# Patient Record
Sex: FEMALE | Race: OTHER | Employment: FULL TIME | URBAN - METROPOLITAN AREA
[De-identification: names, ages, dates, MRNs, and addresses within clinical notes are randomized per-mention and may not be internally consistent; named-entity substitution may affect disease eponyms.]

---

## 2019-02-20 ENCOUNTER — OFFICE VISIT (OUTPATIENT)
Dept: FAMILY MEDICINE CLINIC | Facility: CLINIC | Age: 34
End: 2019-02-20

## 2019-02-20 VITALS
SYSTOLIC BLOOD PRESSURE: 110 MMHG | DIASTOLIC BLOOD PRESSURE: 80 MMHG | OXYGEN SATURATION: 98 % | WEIGHT: 131 LBS | RESPIRATION RATE: 13 BRPM | HEART RATE: 80 BPM | HEIGHT: 65 IN | BODY MASS INDEX: 21.83 KG/M2

## 2019-02-20 DIAGNOSIS — Z30.013 ENCOUNTER FOR INITIAL PRESCRIPTION OF INJECTABLE CONTRACEPTIVE: ICD-10-CM

## 2019-02-20 DIAGNOSIS — N92.1 METRORRHAGIA: ICD-10-CM

## 2019-02-20 DIAGNOSIS — N93.9 ABNORMAL UTERINE BLEEDING (AUB): Primary | ICD-10-CM

## 2019-02-20 LAB
CONTROL LINE PRESENT WITH A CLEAR BACKGROUND (YES/NO): YES YES/NO
DEPRECATED RDW RBC AUTO: 42.2 FL (ref 35.1–46.3)
ERYTHROCYTE [DISTWIDTH] IN BLOOD BY AUTOMATED COUNT: 12.1 % (ref 11–15)
HCT VFR BLD AUTO: 44.3 % (ref 35–48)
HGB BLD-MCNC: 14.7 G/DL (ref 12–16)
MCH RBC QN AUTO: 31.1 PG (ref 26–34)
MCHC RBC AUTO-ENTMCNC: 33.2 G/DL (ref 31–37)
MCV RBC AUTO: 93.7 FL (ref 80–100)
PLATELET # BLD AUTO: 358 10(3)UL (ref 150–450)
PREGNANCY TEST, URINE: NEGATIVE
RBC # BLD AUTO: 4.73 X10(6)UL (ref 3.8–5.3)
TSI SER-ACNC: 1.75 MIU/ML (ref 0.36–3.74)
WBC # BLD AUTO: 9.2 X10(3) UL (ref 4–11)

## 2019-02-20 PROCEDURE — 84443 ASSAY THYROID STIM HORMONE: CPT | Performed by: FAMILY MEDICINE

## 2019-02-20 PROCEDURE — 99203 OFFICE O/P NEW LOW 30 MIN: CPT | Performed by: FAMILY MEDICINE

## 2019-02-20 PROCEDURE — 36415 COLL VENOUS BLD VENIPUNCTURE: CPT | Performed by: FAMILY MEDICINE

## 2019-02-20 PROCEDURE — 85027 COMPLETE CBC AUTOMATED: CPT | Performed by: FAMILY MEDICINE

## 2019-02-20 PROCEDURE — 96372 THER/PROPH/DIAG INJ SC/IM: CPT | Performed by: FAMILY MEDICINE

## 2019-02-20 PROCEDURE — 81025 URINE PREGNANCY TEST: CPT | Performed by: FAMILY MEDICINE

## 2019-02-20 RX ORDER — MEDROXYPROGESTERONE ACETATE 150 MG/ML
150 INJECTION, SUSPENSION INTRAMUSCULAR ONCE
Status: DISCONTINUED | OUTPATIENT
Start: 2019-02-20 | End: 2019-02-20

## 2019-02-20 RX ORDER — MULTIVIT-MIN/IRON FUM/FOLIC AC 7.5 MG-4
1 TABLET ORAL DAILY
COMMUNITY

## 2019-02-20 RX ORDER — MEDROXYPROGESTERONE ACETATE 150 MG/ML
150 INJECTION, SUSPENSION INTRAMUSCULAR ONCE
Status: COMPLETED | OUTPATIENT
Start: 2019-02-20 | End: 2019-02-20

## 2019-02-20 RX ADMIN — MEDROXYPROGESTERONE ACETATE 150 MG: 150 INJECTION, SUSPENSION INTRAMUSCULAR at 17:23:00

## 2019-02-20 NOTE — PROGRESS NOTES
HPI:   Patient presents with:  Menstrual Problem: c/c irregular peridos x1 yr  Other: never had pap smear      Citlali Cunningham is a 26year old female presenting for:    -Having menstrual cycles Q2wks for past 1yr  -Tried OCPs in past which helped as wel OF SYSTEMS:   Review of Systems   Constitutional: Negative for chills, fatigue and fever. Respiratory: Negative for cough and shortness of breath. Cardiovascular: Negative for chest pain, palpitations and leg swelling.    Gastrointestinal: Negative for x1wk. Pt aware that max use is 3yrs. -Encouraged protection against STD    Follow-up in 3mo for physical, pap and bloodwork  Patient indicates understanding of the above recommendations and agrees to the above plan. Reasurrance and education provided.  Al

## 2019-02-20 NOTE — PROGRESS NOTES
CBC and TSH labs drawn per Dr Satnam Newberry orders, Pt tolerated lab draw well    DEPO-PROVERA CONTRACEPTIVE INJECTION 1ml administered to RD IM  Pt aware she needs to return between MAY 8-MAY 22 for next dose

## 2019-02-25 PROBLEM — N92.1 METRORRHAGIA: Status: ACTIVE | Noted: 2019-02-25

## 2019-02-27 ENCOUNTER — TELEPHONE (OUTPATIENT)
Dept: FAMILY MEDICINE CLINIC | Facility: CLINIC | Age: 34
End: 2019-02-27

## 2019-03-01 ENCOUNTER — HOSPITAL ENCOUNTER (OUTPATIENT)
Dept: ULTRASOUND IMAGING | Facility: HOSPITAL | Age: 34
Discharge: HOME | End: 2019-03-01
Attending: FAMILY MEDICINE
Payer: COMMERCIAL

## 2019-03-01 DIAGNOSIS — N92.1 METRORRHAGIA: ICD-10-CM

## 2019-03-01 PROCEDURE — 76830 TRANSVAGINAL US NON-OB: CPT | Performed by: FAMILY MEDICINE

## 2019-03-01 PROCEDURE — 76856 US EXAM PELVIC COMPLETE: CPT | Performed by: FAMILY MEDICINE

## 2019-03-05 ENCOUNTER — TELEPHONE (OUTPATIENT)
Dept: FAMILY MEDICINE CLINIC | Facility: CLINIC | Age: 34
End: 2019-03-05

## 2019-03-05 NOTE — TELEPHONE ENCOUNTER
Pt called back  verified, results below given  Per patient she would like to know what she can take for bone pain and fatigue since labs are normal, per Dr Merrilee Osgood multivitamin and 800-1000 units of Vitamin D with calcium, pt informed

## 2019-03-05 NOTE — TELEPHONE ENCOUNTER
----- Message from Anderson Ortega MD sent at 3/4/2019 10:30 AM CST -----  Please let her know that her U/S is normal. She has small simple ovarian cysts that are benign and usually go away on their own.

## 2019-04-01 ENCOUNTER — OFFICE VISIT (OUTPATIENT)
Dept: FAMILY MEDICINE CLINIC | Facility: CLINIC | Age: 34
End: 2019-04-01

## 2019-04-01 VITALS
SYSTOLIC BLOOD PRESSURE: 110 MMHG | BODY MASS INDEX: 21.49 KG/M2 | OXYGEN SATURATION: 99 % | RESPIRATION RATE: 12 BRPM | HEIGHT: 65 IN | TEMPERATURE: 99 F | DIASTOLIC BLOOD PRESSURE: 70 MMHG | HEART RATE: 76 BPM | WEIGHT: 129 LBS

## 2019-04-01 DIAGNOSIS — R53.82 CHRONIC FATIGUE: ICD-10-CM

## 2019-04-01 DIAGNOSIS — K29.00 ACUTE GASTRITIS WITHOUT HEMORRHAGE, UNSPECIFIED GASTRITIS TYPE: Primary | ICD-10-CM

## 2019-04-01 DIAGNOSIS — M25.50 POLYARTHRALGIA: ICD-10-CM

## 2019-04-01 PROCEDURE — 99214 OFFICE O/P EST MOD 30 MIN: CPT | Performed by: FAMILY MEDICINE

## 2019-04-01 PROCEDURE — 86038 ANTINUCLEAR ANTIBODIES: CPT | Performed by: FAMILY MEDICINE

## 2019-04-01 PROCEDURE — 85652 RBC SED RATE AUTOMATED: CPT | Performed by: FAMILY MEDICINE

## 2019-04-01 PROCEDURE — 86431 RHEUMATOID FACTOR QUANT: CPT | Performed by: FAMILY MEDICINE

## 2019-04-01 PROCEDURE — 82306 VITAMIN D 25 HYDROXY: CPT | Performed by: FAMILY MEDICINE

## 2019-04-01 PROCEDURE — 36415 COLL VENOUS BLD VENIPUNCTURE: CPT | Performed by: FAMILY MEDICINE

## 2019-04-01 PROCEDURE — 80053 COMPREHEN METABOLIC PANEL: CPT | Performed by: FAMILY MEDICINE

## 2019-04-01 PROCEDURE — 86140 C-REACTIVE PROTEIN: CPT | Performed by: FAMILY MEDICINE

## 2019-04-01 PROCEDURE — 86200 CCP ANTIBODY: CPT | Performed by: FAMILY MEDICINE

## 2019-04-01 RX ORDER — OMEGA-3-ACID ETHYL ESTERS 1 G/1
1 CAPSULE, LIQUID FILLED ORAL DAILY
COMMUNITY

## 2019-04-01 RX ORDER — LEVOCETIRIZINE DIHYDROCHLORIDE 5 MG/1
5 TABLET, FILM COATED ORAL EVERY EVENING
Qty: 30 TABLET | Refills: 2 | Status: SHIPPED | OUTPATIENT
Start: 2019-04-01 | End: 2020-06-18

## 2019-04-01 RX ORDER — FAMOTIDINE 40 MG/1
40 TABLET, FILM COATED ORAL DAILY
Qty: 30 TABLET | Refills: 2 | Status: SHIPPED | OUTPATIENT
Start: 2019-04-01 | End: 2019-08-14

## 2019-04-01 NOTE — PROGRESS NOTES
VITAMIN D, DALTON,CMP,C-REACTIVE,SED RATE,CYCLIN,and RA factor labs drawn per Dr Natalie Nava orders, Pt tolerated lab draw well

## 2019-04-01 NOTE — PROGRESS NOTES
HPI:   Patient presents with:  Cough: c/c cough, vomiting saliva x2 months      Darien Marcial is a 26year old female presenting for: For past 3mo having nausea, post-prandial cough, acid reflux.  Sometimes vomits  No abdominal pain, fever, heartbu Results   Component Value Date/Time    GLU 93 04/01/2019 04:45 PM     04/01/2019 04:45 PM    K 3.6 04/01/2019 04:45 PM     (H) 04/01/2019 04:45 PM    CO2 23.0 04/01/2019 04:45 PM    CREATSERUM 0.54 (L) 04/01/2019 04:45 PM    CA 8.7 04/01/2019 0 PHYSICAL EXAM:   /70   Pulse 76   Temp 99 °F (37.2 °C)   Resp 12   Ht 65\"   Wt 129 lb   SpO2 99%   BMI 21.47 kg/m²  Estimated body mass index is 21.47 kg/m² as calculated from the following:    Height as of this encounter: 65\".     Weight as of th PANEL (14); Future  - DALTON,DIRECT,REFLEX TITER + SPECIFIC ANTIBODIES; Future  -  3.  Chronic fatigue  -Supportive care discussed including proper sleep, hydration, healthy eating, exercise, stress-relieve  -Bloodwork obtained (previous TSH and CBC wnl, will

## 2019-04-03 PROBLEM — R53.82 CHRONIC FATIGUE: Status: ACTIVE | Noted: 2019-04-03

## 2019-04-03 PROBLEM — M25.50 POLYARTHRALGIA: Status: ACTIVE | Noted: 2019-04-03

## 2019-04-03 PROBLEM — K29.00 ACUTE GASTRITIS WITHOUT HEMORRHAGE: Status: ACTIVE | Noted: 2019-04-03

## 2019-04-17 ENCOUNTER — OFFICE VISIT (OUTPATIENT)
Dept: FAMILY MEDICINE CLINIC | Facility: CLINIC | Age: 34
End: 2019-04-17

## 2019-04-17 ENCOUNTER — TELEPHONE (OUTPATIENT)
Dept: FAMILY MEDICINE CLINIC | Facility: CLINIC | Age: 34
End: 2019-04-17

## 2019-04-17 VITALS
RESPIRATION RATE: 12 BRPM | WEIGHT: 131 LBS | SYSTOLIC BLOOD PRESSURE: 110 MMHG | HEART RATE: 94 BPM | DIASTOLIC BLOOD PRESSURE: 70 MMHG | HEIGHT: 65 IN | OXYGEN SATURATION: 99 % | BODY MASS INDEX: 21.83 KG/M2

## 2019-04-17 DIAGNOSIS — M20.22 HALLUX RIGIDUS OF LEFT FOOT: ICD-10-CM

## 2019-04-17 DIAGNOSIS — L60.0 INGROWN NAIL: Primary | ICD-10-CM

## 2019-04-17 DIAGNOSIS — K29.00 ACUTE GASTRITIS WITHOUT HEMORRHAGE, UNSPECIFIED GASTRITIS TYPE: ICD-10-CM

## 2019-04-17 DIAGNOSIS — H52.209 ASTIGMATISM, UNSPECIFIED LATERALITY, UNSPECIFIED TYPE: ICD-10-CM

## 2019-04-17 PROCEDURE — 99214 OFFICE O/P EST MOD 30 MIN: CPT | Performed by: FAMILY MEDICINE

## 2019-04-17 RX ORDER — CHLORHEXIDINE GLUCONATE 2% 2 G/100ML
SOLUTION TOPICAL
Qty: 60 ML | Refills: 0 | Status: SHIPPED | OUTPATIENT
Start: 2019-04-17 | End: 2019-06-14

## 2019-04-17 NOTE — PATIENT INSTRUCTIONS
Apply topical antibiotic after each warm soak. Warm soaks (of water mixed with small amount of chlorhexidine antiseptic solution) should last 10 to 15 minutes.

## 2019-04-17 NOTE — PROGRESS NOTES
HPI:   Patient presents with:  Ingrown Toenail: left foot ingrown toenail x2 weeks      Dyane Peabody is a 26year old female presenting for:    Left foot  -has ingrown toenail recurrently that is becoming painful.  No surrounding infection  -has both  04/01/2019 04:45 PM    K 3.6 04/01/2019 04:45 PM     (H) 04/01/2019 04:45 PM    CO2 23.0 04/01/2019 04:45 PM    CREATSERUM 0.54 (L) 04/01/2019 04:45 PM    CA 8.7 04/01/2019 04:45 PM    ALB 3.7 04/01/2019 04:45 PM    TP 7.4 04/01/2019 04:45 PM Neurological: Negative for headaches.           PHYSICAL EXAM:   /70   Pulse 94   Resp 12   Ht 65\"   Wt 131 lb   SpO2 99%   BMI 21.80 kg/m²  Estimated body mass index is 21.8 kg/m² as calculated from the following:    Height as of this encounter: 65\ Requested Prescriptions     Signed Prescriptions Disp Refills   • Chlorhexidine Gluconate 2 % External Solution 60 mL 0     Sig: Apply small amount to warm water for antiseptic soaks for the toenails.        No orders of the defined types were placed in [facility]

## 2019-04-23 NOTE — TELEPHONE ENCOUNTER
Good afternoon,     Please add supporting clinicals for pt to go OON. P will not process this ref w/o this info.      Thank you,   Desi

## 2019-04-25 NOTE — TELEPHONE ENCOUNTER
Patient informed that [facility] is out of network; she needs to utilize [employer]. Will contact Dr. Lina Ferris for an appointment.

## 2019-05-08 ENCOUNTER — OFFICE VISIT (OUTPATIENT)
Dept: PODIATRY CLINIC | Facility: CLINIC | Age: 34
End: 2019-05-08

## 2019-05-08 DIAGNOSIS — L60.0 INGROWN TOENAIL OF LEFT FOOT: Primary | ICD-10-CM

## 2019-05-08 PROCEDURE — 99243 OFF/OP CNSLTJ NEW/EST LOW 30: CPT | Performed by: PODIATRIST

## 2019-05-08 PROCEDURE — 99212 OFFICE O/P EST SF 10 MIN: CPT | Performed by: PODIATRIST

## 2019-05-08 NOTE — PROGRESS NOTES
HPI:    Patient ID: Slick Puga is a 37year old female. This pleasant 68-year-old female presents as a new patient to me on consult from [address]. She is accompanied today by her  and 1 of my staff assisted in translation from [place] local anesthesia at her convenience. Second concern is an area of keratosis over the proximal interphalangeal joint of the fifth right toe. She trims it periodically and has only temporary relief.   The fifth toe has a contracted position and there is a d

## 2019-05-15 ENCOUNTER — OFFICE VISIT (OUTPATIENT)
Dept: PODIATRY CLINIC | Facility: CLINIC | Age: 34
End: 2019-05-15

## 2019-05-15 ENCOUNTER — OFFICE VISIT (OUTPATIENT)
Dept: FAMILY MEDICINE CLINIC | Facility: CLINIC | Age: 34
End: 2019-05-15

## 2019-05-15 VITALS
HEART RATE: 71 BPM | BODY MASS INDEX: 21.66 KG/M2 | SYSTOLIC BLOOD PRESSURE: 120 MMHG | DIASTOLIC BLOOD PRESSURE: 80 MMHG | OXYGEN SATURATION: 98 % | WEIGHT: 130 LBS | HEIGHT: 65 IN

## 2019-05-15 VITALS — HEART RATE: 71 BPM | DIASTOLIC BLOOD PRESSURE: 69 MMHG | SYSTOLIC BLOOD PRESSURE: 107 MMHG

## 2019-05-15 DIAGNOSIS — Z30.42 ENCOUNTER FOR SURVEILLANCE OF INJECTABLE CONTRACEPTIVE: ICD-10-CM

## 2019-05-15 DIAGNOSIS — Z12.4 PAP SMEAR FOR CERVICAL CANCER SCREENING: ICD-10-CM

## 2019-05-15 DIAGNOSIS — L60.0 INGROWN TOENAIL OF LEFT FOOT: Primary | ICD-10-CM

## 2019-05-15 DIAGNOSIS — G89.29 CHRONIC RIGHT HIP PAIN: ICD-10-CM

## 2019-05-15 DIAGNOSIS — M25.561 CHRONIC PAIN OF BOTH KNEES: ICD-10-CM

## 2019-05-15 DIAGNOSIS — M25.551 CHRONIC RIGHT HIP PAIN: ICD-10-CM

## 2019-05-15 DIAGNOSIS — F42.9 OBSESSIVE-COMPULSIVE DISORDER, UNSPECIFIED TYPE: ICD-10-CM

## 2019-05-15 DIAGNOSIS — M25.562 CHRONIC PAIN OF BOTH KNEES: ICD-10-CM

## 2019-05-15 DIAGNOSIS — G89.29 CHRONIC PAIN OF BOTH KNEES: ICD-10-CM

## 2019-05-15 DIAGNOSIS — Z00.00 WELLNESS EXAMINATION: Primary | ICD-10-CM

## 2019-05-15 PROCEDURE — 81025 URINE PREGNANCY TEST: CPT | Performed by: FAMILY MEDICINE

## 2019-05-15 PROCEDURE — 11750 EXCISION NAIL&NAIL MATRIX: CPT | Performed by: PODIATRIST

## 2019-05-15 PROCEDURE — 99213 OFFICE O/P EST LOW 20 MIN: CPT | Performed by: FAMILY MEDICINE

## 2019-05-15 PROCEDURE — 96372 THER/PROPH/DIAG INJ SC/IM: CPT | Performed by: FAMILY MEDICINE

## 2019-05-15 PROCEDURE — 87625 HPV TYPES 16 & 18 ONLY: CPT | Performed by: FAMILY MEDICINE

## 2019-05-15 PROCEDURE — 88175 CYTOPATH C/V AUTO FLUID REDO: CPT | Performed by: FAMILY MEDICINE

## 2019-05-15 PROCEDURE — 87624 HPV HI-RISK TYP POOLED RSLT: CPT | Performed by: FAMILY MEDICINE

## 2019-05-15 PROCEDURE — 99395 PREV VISIT EST AGE 18-39: CPT | Performed by: FAMILY MEDICINE

## 2019-05-15 RX ORDER — FLUOXETINE 20 MG/1
20 TABLET, FILM COATED ORAL DAILY
Qty: 90 TABLET | Refills: 0 | Status: SHIPPED | OUTPATIENT
Start: 2019-05-15 | End: 2019-07-17 | Stop reason: DRUGHIGH

## 2019-05-15 RX ORDER — MEDROXYPROGESTERONE ACETATE 150 MG/ML
150 INJECTION, SUSPENSION INTRAMUSCULAR ONCE
Status: DISCONTINUED | OUTPATIENT
Start: 2019-05-15 | End: 2019-05-15

## 2019-05-15 RX ORDER — CYCLOBENZAPRINE HCL 5 MG
5 TABLET ORAL NIGHTLY
Qty: 14 TABLET | Refills: 0 | Status: SHIPPED | OUTPATIENT
Start: 2019-05-15 | End: 2019-05-29

## 2019-05-15 RX ORDER — METHYLPREDNISOLONE 4 MG/1
TABLET ORAL
Qty: 1 KIT | Refills: 0 | Status: SHIPPED | OUTPATIENT
Start: 2019-05-15 | End: 2019-06-14

## 2019-05-15 RX ORDER — MEDROXYPROGESTERONE ACETATE 150 MG/ML
150 INJECTION, SUSPENSION INTRAMUSCULAR ONCE
Status: COMPLETED | OUTPATIENT
Start: 2019-05-15 | End: 2019-05-15

## 2019-05-15 RX ADMIN — MEDROXYPROGESTERONE ACETATE 150 MG: 150 INJECTION, SUSPENSION INTRAMUSCULAR at 19:27:00

## 2019-05-15 NOTE — PROGRESS NOTES
HPI:    Patient ID: Serina Green is a 37year old female. This 57-year-old female presents for correction of ingrown toenail of the left hallux. After review of the procedure patient signed a written consent.       ROS:     I did review medical statu

## 2019-05-15 NOTE — PROGRESS NOTES
CC: Annual Physical Exam    HPI:   Jigar Alston is a 37year old female who presents for a complete physical exam.    HCM  -Diet:  Well-balanced.   -Exercise irregularly  -Mental Health: denies any depression or anxiety sx  -Skin care:  no concerning antiseptic soaks for the toenails. Disp: 60 mL Rfl: 0   Omega-3-acid Ethyl Esters 1 g Oral Cap Take 1 g by mouth daily. Disp:  Rfl:    famotidine 40 MG Oral Tab Take 1 tablet (40 mg total) by mouth daily.  For gastritis Disp: 30 tablet Rfl: 2   Levocetirizi is 21.63 kg/m² as calculated from the following:    Height as of this encounter: 65\". Weight as of this encounter: 130 lb. Vital signs reviewed. Appears stated age, well groomed.   Physical Exam:  GEN:  Patient is alert, awake and oriented, well develo disorder, unspecified type    Depression/Anxiety  -trial of fluoxetine. Discussed side effects, delayed effectiveness, increased suicidality  - referral declined  -encouraged stress-relieving techniques    5. Chronic right hip pain  6.  Chronic pain of mary

## 2019-05-16 NOTE — PROGRESS NOTES
Depo-Provera 1ml  LOT [id number]  EXP 04/30/2021  NDC: 3639-2668-77    Depo 1ml administered to RD IM ,pregnancy test negative  Pt should return between [address] [place] [address] 31- AUGUST 14 for next dose

## 2019-05-21 ENCOUNTER — OFFICE VISIT (OUTPATIENT)
Dept: PODIATRY CLINIC | Facility: CLINIC | Age: 34
End: 2019-05-21

## 2019-05-21 DIAGNOSIS — L60.0 INGROWN TOENAIL OF LEFT FOOT: Primary | ICD-10-CM

## 2019-05-21 PROCEDURE — 99212 OFFICE O/P EST SF 10 MIN: CPT | Performed by: PODIATRIST

## 2019-05-21 PROCEDURE — 99024 POSTOP FOLLOW-UP VISIT: CPT | Performed by: PODIATRIST

## 2019-05-21 NOTE — PROGRESS NOTES
HPI:    Patient ID: Laila Barry is a 37year old female. This 75-year-old female presents 1 week post ingrown toenail procedure. She has no specific noted complaints or concerns.   The draining noted is consistent with the procedure there is no sign

## 2019-05-24 ENCOUNTER — TELEPHONE (OUTPATIENT)
Dept: FAMILY MEDICINE CLINIC | Facility: CLINIC | Age: 34
End: 2019-05-24

## 2019-05-28 DIAGNOSIS — R87.610 ASCUS WITH POSITIVE HIGH RISK HPV CERVICAL: Primary | ICD-10-CM

## 2019-05-28 DIAGNOSIS — R87.810 ASCUS WITH POSITIVE HIGH RISK HPV CERVICAL: Primary | ICD-10-CM

## 2019-06-12 ENCOUNTER — OFFICE VISIT (OUTPATIENT)
Dept: PODIATRY CLINIC | Facility: CLINIC | Age: 34
End: 2019-06-12

## 2019-06-12 DIAGNOSIS — L60.0 INGROWN TOENAIL OF LEFT FOOT: Primary | ICD-10-CM

## 2019-06-12 PROCEDURE — 99212 OFFICE O/P EST SF 10 MIN: CPT | Performed by: PODIATRIST

## 2019-06-12 NOTE — PROGRESS NOTES
HPI:    Patient ID: Darien Ceja is a 37year old female. 33-year-old female presents 1 month post ingrown toenail procedure of the right great toe. Patient reports no noted concerns.       ROS:              Current Outpatient Medications:  FLUoxetin

## 2019-06-14 ENCOUNTER — TELEPHONE (OUTPATIENT)
Dept: OBGYN CLINIC | Facility: CLINIC | Age: 34
End: 2019-06-14

## 2019-06-14 ENCOUNTER — OFFICE VISIT (OUTPATIENT)
Dept: OBGYN CLINIC | Facility: CLINIC | Age: 34
End: 2019-06-14

## 2019-06-14 VITALS — DIASTOLIC BLOOD PRESSURE: 70 MMHG | SYSTOLIC BLOOD PRESSURE: 112 MMHG | HEART RATE: 89 BPM

## 2019-06-14 DIAGNOSIS — R87.610 ASCUS WITH POSITIVE HIGH RISK HPV CERVICAL: Primary | ICD-10-CM

## 2019-06-14 DIAGNOSIS — R87.810 ASCUS WITH POSITIVE HIGH RISK HPV CERVICAL: Primary | ICD-10-CM

## 2019-06-14 DIAGNOSIS — F41.8 ANXIETY ABOUT HEALTH: ICD-10-CM

## 2019-06-14 PROCEDURE — 99244 OFF/OP CNSLTJ NEW/EST MOD 40: CPT | Performed by: OBSTETRICS & GYNECOLOGY

## 2019-06-14 NOTE — TELEPHONE ENCOUNTER
Pt was seen today  Was told to get gardasil vacc, ck'g  with ins for coverage    Should she have vacc before colpo (7-12-19) or can be given at same date of colpo    Please verify

## 2019-06-14 NOTE — PROGRESS NOTES
Rizwana Gu is a 37year old female  No LMP recorded. Patient has had an injection.  Patient presents with:  Consult: abnormal pap per Dr. Monty Payne -- new pt. -- first pap ever; 3 partners whole life -- started at 27 y.o.; never had ga current or ex partner: Not on file        Emotionally abused: Not on file        Physically abused: Not on file        Forced sexual activity: Not on file    Other Topics      Concerns:        Caffeine Concern: Not Asked        Exercise: Not Asked        S , spoke to pt re: HPV pathophys & ways to try to suppress. Consider Gardasil 9. Need for colpo reviewed w/ pt -- overall plan of care based on result -- if neg or LINDY 1, cotest in one year. If LINDY 2 or higher, LEEP & then cotest in one year.  All

## 2019-06-14 NOTE — TELEPHONE ENCOUNTER
Pt was seen today and notes are not completed yet. Okay for pt to have 1st Gardasil on same day as Colpo?

## 2019-06-14 NOTE — TELEPHONE ENCOUNTER
Notified pt, per [facility] she can get Gardasil inj when she comes in for Colpo. Advised pt to check with her insurance for coverage. Pt agrees. Routed to referral pool in case pt needs referral for Gardasil injection or Colpo.

## 2019-06-17 NOTE — TELEPHONE ENCOUNTER
Ariana Haque,  #[id number] PT NOTIFIED THAT WE ARE FAIRLY CERTAIN HPV SHOTS ARE COVERED THROUGH OUR OFFICE BUT A REFERRAL WAS PLACED. GAVE HER THE PHONE NUMBER FOR MANAGED CARE TO CHECK THE STATUS IN ABOUT 1 WEEK.   ALSO ADVISED THAT ABDI

## 2019-06-17 NOTE — TELEPHONE ENCOUNTER
Kyrgyz speaker- stated when she called her insurance, they told her the gardisil shot will be covered if her primary dr administers.  She wants to verify with RN if [facility] administers, does she need a referral from Dr. Libia Bartholomew, or does she need to have it do

## 2019-07-09 ENCOUNTER — TELEPHONE (OUTPATIENT)
Dept: OBGYN CLINIC | Facility: CLINIC | Age: 34
End: 2019-07-09

## 2019-07-09 NOTE — TELEPHONE ENCOUNTER
See other 6/14/19 comm. In pts referral tab in chart, its states that gardisil referral was authorized. Pt informed and verbalized understanding.

## 2019-07-12 ENCOUNTER — OFFICE VISIT (OUTPATIENT)
Dept: OBGYN CLINIC | Facility: CLINIC | Age: 34
End: 2019-07-12

## 2019-07-12 VITALS — DIASTOLIC BLOOD PRESSURE: 68 MMHG | SYSTOLIC BLOOD PRESSURE: 111 MMHG | HEART RATE: 73 BPM

## 2019-07-12 DIAGNOSIS — R87.610 PAPANICOLAOU SMEAR OF CERVIX WITH ATYPICAL SQUAMOUS CELLS OF UNDETERMINED SIGNIFICANCE (ASC-US): ICD-10-CM

## 2019-07-12 DIAGNOSIS — R87.810 CERVICAL HIGH RISK HUMAN PAPILLOMAVIRUS (HPV) DNA TEST POSITIVE: ICD-10-CM

## 2019-07-12 PROCEDURE — 90651 9VHPV VACCINE 2/3 DOSE IM: CPT | Performed by: OBSTETRICS & GYNECOLOGY

## 2019-07-12 PROCEDURE — 57454 BX/CURETT OF CERVIX W/SCOPE: CPT | Performed by: OBSTETRICS & GYNECOLOGY

## 2019-07-12 PROCEDURE — 90471 IMMUNIZATION ADMIN: CPT | Performed by: OBSTETRICS & GYNECOLOGY

## 2019-07-12 NOTE — PROCEDURES
Colpo w/Cx Biopsy and ECC      Birth control method(s) used: depoprovera    Consent signed. Procedure discussed with patient in detail including indication, risk, benefits, alternatives and complications.     Indication: ASCUS (+) HPV    Procedure:  Unde

## 2019-07-12 NOTE — PROGRESS NOTES
Pt was in office for colpo, pt was given 1st dose of Gardasil. Injection was given on the RT arm. Pt monitored for 15 minutes.  Pt did state that she does not do well with injections, pt did start to feel a little lightheaded and asked for an alcohol wipe t

## 2019-07-17 NOTE — PROGRESS NOTES
My Chart message sent to patient re: colpo LINDY 1. Please follow -up & make sure pt saw their letter within next week. Enter patient info into Pap log.

## 2019-07-17 NOTE — PROGRESS NOTES
HPI:   Patient presents with:  Medication Follow-Up      Myah Hernandez is a 37year old female presenting for:  OCD  -about 50% improved  -feels better    Stool findings  -on a couple occasions has found \"white dots on my stool\".   -no foreing trave formalin-filled jar labeled \"Talia, cervical biopsy. \" The specimen consists of one piece of pink to white-tan possible cervical tissue measuring 0.2 cm in greatest dimension. The specimen is submitted in total in cassette A.      Specimen B is received Diagnosis Date   • Anxiety    • Thyroid disease     resolved per pt         No past surgical history on file.   No Known Allergies   Social History:  Social History    Tobacco Use      Smoking status: Never Smoker      Smokeless tobacco: Never Used    Alc Abdominal: Soft. Bowel sounds are normal. She exhibits no distension. There is no tenderness. Musculoskeletal: She exhibits no edema. Neurological: No cranial nerve deficit. Psychiatric: She has a normal mood and affect.  Her behavior is normal.   V

## 2019-07-18 ENCOUNTER — TELEPHONE (OUTPATIENT)
Dept: OBGYN CLINIC | Facility: CLINIC | Age: 34
End: 2019-07-18

## 2019-07-18 NOTE — TELEPHONE ENCOUNTER
----- Message from Shravan Barney MD sent at 7/17/2019  6:42 PM CDT -----  My Chart message sent to patient re: colpo LINDY 1. Please follow -up & make sure pt saw their letter within next week. Enter patient info into Pap log.

## 2019-07-18 NOTE — TELEPHONE ENCOUNTER
Pt given results vis [facility], [address] #[id number]. Appt made for cotest on 7/20/20. Pt is wondering if she needs to refrain from intercourse until she is done with all 3 gardasil injections.   Pt states it might be with the same partner, or with a differe

## 2019-07-24 ENCOUNTER — APPOINTMENT (OUTPATIENT)
Dept: LAB | Facility: HOSPITAL | Age: 34
End: 2019-07-24
Attending: FAMILY MEDICINE
Payer: COMMERCIAL

## 2019-07-24 DIAGNOSIS — R19.5 ABNORMAL FINDINGS IN STOOL: ICD-10-CM

## 2019-07-24 LAB
ADENOVIRUS F 40/41 PCR: NEGATIVE
ASTROVIRUS PCR: NEGATIVE
C CAYETANENSIS DNA SPEC QL NAA+PROBE: NEGATIVE
C. DIFFICILE TOXIN A/B PCR: NEGATIVE
CAMPY SP DNA.DIARRHEA STL QL NAA+PROBE: NEGATIVE
CRYPTOSP DNA SPEC QL NAA+PROBE: NEGATIVE
EAEC PAA PLAS AGGR+AATA ST NAA+NON-PRB: NEGATIVE
EC STX1+STX2 + H7 FLIC SPEC NAA+PROBE: NEGATIVE
ENTAMOEBA HISTOLYTICA PCR: NEGATIVE
EPEC EAE GENE STL QL NAA+NON-PROBE: POSITIVE
ETEC LTA+ST1A+ST1B TOX ST NAA+NON-PROBE: NEGATIVE
GIARDIA LAMBLIA PCR: NEGATIVE
NOROVIRUS GI/GII PCR: NEGATIVE
P SHIGELLOIDES DNA STL QL NAA+PROBE: NEGATIVE
ROTAVIRUS A PCR: NEGATIVE
SALMONELLA DNA SPEC QL NAA+PROBE: NEGATIVE
SAPOVIRUS PCR: NEGATIVE
SHIGELLA SP+EIEC IPAH ST NAA+NON-PROBE: NEGATIVE
V CHOLERAE DNA SPEC QL NAA+PROBE: NEGATIVE
VIBRIO DNA SPEC NAA+PROBE: NEGATIVE
YERSINIA DNA SPEC NAA+PROBE: NEGATIVE

## 2019-07-24 PROCEDURE — 87507 IADNA-DNA/RNA PROBE TQ 12-25: CPT

## 2019-08-14 ENCOUNTER — TELEPHONE (OUTPATIENT)
Dept: FAMILY MEDICINE CLINIC | Facility: CLINIC | Age: 34
End: 2019-08-14

## 2019-08-14 DIAGNOSIS — K29.00 ACUTE GASTRITIS WITHOUT HEMORRHAGE, UNSPECIFIED GASTRITIS TYPE: ICD-10-CM

## 2019-08-14 RX ORDER — FAMOTIDINE 40 MG/1
40 TABLET, FILM COATED ORAL DAILY
Qty: 30 TABLET | Refills: 2 | Status: SHIPPED | OUTPATIENT
Start: 2019-08-14 | End: 2019-11-20

## 2019-08-14 RX ORDER — CYCLOBENZAPRINE HCL 5 MG
5 TABLET ORAL NIGHTLY
Qty: 14 TABLET | Refills: 0 | Status: SHIPPED | OUTPATIENT
Start: 2019-08-14 | End: 2019-08-28

## 2019-09-11 ENCOUNTER — TELEPHONE (OUTPATIENT)
Dept: OBGYN CLINIC | Facility: CLINIC | Age: 34
End: 2019-09-11

## 2019-09-11 DIAGNOSIS — Z23 NEED FOR HPV VACCINE: Primary | ICD-10-CM

## 2019-09-11 NOTE — TELEPHONE ENCOUNTER
Used . Pt made an appt for 9/21/19 for Gardasil. When appt made, it stated that a referral was needed. Sent to our referral coordinator.

## 2019-09-11 NOTE — TELEPHONE ENCOUNTER
Pt was scheduled for an injection tomorrow and canceled pt unsure the name but wondering if ok to wait jammie reschedule on the 28th.  Please advise Paraguayan speaking

## 2019-09-21 ENCOUNTER — NURSE ONLY (OUTPATIENT)
Dept: OBGYN CLINIC | Facility: CLINIC | Age: 34
End: 2019-09-21

## 2019-09-21 VITALS — DIASTOLIC BLOOD PRESSURE: 72 MMHG | SYSTOLIC BLOOD PRESSURE: 109 MMHG | HEART RATE: 80 BPM

## 2019-09-21 DIAGNOSIS — Z23 NEED FOR HPV VACCINE: Primary | ICD-10-CM

## 2019-09-21 PROCEDURE — 90471 IMMUNIZATION ADMIN: CPT | Performed by: OBSTETRICS & GYNECOLOGY

## 2019-09-21 PROCEDURE — 90651 9VHPV VACCINE 2/3 DOSE IM: CPT | Performed by: OBSTETRICS & GYNECOLOGY

## 2019-09-21 PROCEDURE — 99211 OFF/OP EST MAY X REQ PHY/QHP: CPT | Performed by: OBSTETRICS & GYNECOLOGY

## 2019-09-21 NOTE — PROGRESS NOTES
PT HERE TODAY FOR GARDASIL #2, PT IS WITHOUT ANY COMPLAINT,PT WAS MONITORED FOR 15 MINUTES WITHOUT ANY ADVERSE REACTION. PT WILL RETURN TO THE OFFICE IN 4 MONTHS FOR THE THIRD DOSE.

## 2019-10-16 ENCOUNTER — TELEPHONE (OUTPATIENT)
Dept: FAMILY MEDICINE CLINIC | Facility: CLINIC | Age: 34
End: 2019-10-16

## 2019-10-17 RX ORDER — CYCLOBENZAPRINE HCL 5 MG
5 TABLET ORAL NIGHTLY
Qty: 7 TABLET | Refills: 0 | Status: SHIPPED | OUTPATIENT
Start: 2019-10-17 | End: 2019-11-24

## 2019-10-27 DIAGNOSIS — F42.9 OBSESSIVE-COMPULSIVE DISORDER, UNSPECIFIED TYPE: ICD-10-CM

## 2019-10-30 RX ORDER — FLUOXETINE HYDROCHLORIDE 40 MG/1
CAPSULE ORAL
Qty: 30 CAPSULE | Refills: 0 | Status: SHIPPED | OUTPATIENT
Start: 2019-10-30 | End: 2019-11-20

## 2019-11-20 ENCOUNTER — OFFICE VISIT (OUTPATIENT)
Dept: FAMILY MEDICINE CLINIC | Facility: CLINIC | Age: 34
End: 2019-11-20

## 2019-11-20 VITALS
RESPIRATION RATE: 13 BRPM | OXYGEN SATURATION: 99 % | HEART RATE: 84 BPM | WEIGHT: 126 LBS | HEIGHT: 65 IN | BODY MASS INDEX: 20.99 KG/M2 | DIASTOLIC BLOOD PRESSURE: 74 MMHG | SYSTOLIC BLOOD PRESSURE: 122 MMHG

## 2019-11-20 DIAGNOSIS — K29.00 ACUTE GASTRITIS WITHOUT HEMORRHAGE, UNSPECIFIED GASTRITIS TYPE: Primary | ICD-10-CM

## 2019-11-20 DIAGNOSIS — L81.9 HYPERPIGMENTATION: ICD-10-CM

## 2019-11-20 DIAGNOSIS — F42.9 OBSESSIVE-COMPULSIVE DISORDER, UNSPECIFIED TYPE: ICD-10-CM

## 2019-11-20 PROCEDURE — 99214 OFFICE O/P EST MOD 30 MIN: CPT | Performed by: FAMILY MEDICINE

## 2019-11-20 RX ORDER — FLUOXETINE HYDROCHLORIDE 40 MG/1
80 CAPSULE ORAL DAILY
Qty: 180 CAPSULE | Refills: 0 | Status: SHIPPED | OUTPATIENT
Start: 2019-11-20 | End: 2020-08-10 | Stop reason: ALTCHOICE

## 2019-11-20 RX ORDER — FAMOTIDINE 40 MG/1
40 TABLET, FILM COATED ORAL DAILY PRN
Qty: 30 TABLET | Refills: 2 | Status: SHIPPED | OUTPATIENT
Start: 2019-11-20 | End: 2020-08-10 | Stop reason: ALTCHOICE

## 2019-11-21 NOTE — PROGRESS NOTES
HPI:   Patient presents with:  Medication Follow-Up      Matildamarianna Ordaz is a 37year old female presenting for:    OCD  -improving but still w/ flare-ups  -has been taking 2 tablets at home instead and its healing better.  -feels better     Gastritis 05:12 PM          Medications:  Current Outpatient Medications   Medication Sig Dispense Refill   • famotidine 40 MG Oral Tab Take 1 tablet (40 mg total) by mouth daily as needed.  For gastritis 30 tablet 2   • FLUoxetine HCl 40 MG Oral Cap Take 2 capsules Wt 126 lb (57.2 kg)   SpO2 99%   BMI 20.97 kg/m²  Estimated body mass index is 20.97 kg/m² as calculated from the following:    Height as of this encounter: 65\". Weight as of this encounter: 126 lb (57.2 kg).    Wt Readings from Last 3 Encounters:  11/2 plan.  Reasurrance and education provided. All questions answered. Notified to call with any questions, complications, allergies, or worsening or changing symptoms as well as any side effects or complications from the treatments .   Red flags/ ER precautio

## 2019-11-22 PROBLEM — F42.9 OBSESSIVE-COMPULSIVE DISORDER: Status: ACTIVE | Noted: 2019-11-22

## 2019-11-25 RX ORDER — CYCLOBENZAPRINE HCL 5 MG
TABLET ORAL
Qty: 7 TABLET | Refills: 0 | Status: SHIPPED | OUTPATIENT
Start: 2019-11-25 | End: 2020-01-25

## 2020-01-25 ENCOUNTER — NURSE ONLY (OUTPATIENT)
Dept: OBGYN CLINIC | Facility: CLINIC | Age: 35
End: 2020-01-25

## 2020-01-25 VITALS
WEIGHT: 126 LBS | DIASTOLIC BLOOD PRESSURE: 66 MMHG | BODY MASS INDEX: 21 KG/M2 | SYSTOLIC BLOOD PRESSURE: 113 MMHG | HEART RATE: 88 BPM

## 2020-01-25 DIAGNOSIS — Z23 NEED FOR HPV VACCINATION: Primary | ICD-10-CM

## 2020-01-25 PROCEDURE — 90471 IMMUNIZATION ADMIN: CPT | Performed by: OBSTETRICS & GYNECOLOGY

## 2020-01-25 PROCEDURE — 90651 9VHPV VACCINE 2/3 DOSE IM: CPT | Performed by: OBSTETRICS & GYNECOLOGY

## 2020-01-25 RX ORDER — CYCLOBENZAPRINE HCL 5 MG
TABLET ORAL
Qty: 7 TABLET | Refills: 0 | Status: SHIPPED | OUTPATIENT
Start: 2020-01-25 | End: 2020-02-20 | Stop reason: ALTCHOICE

## 2020-01-25 RX ORDER — CYCLOBENZAPRINE HCL 5 MG
TABLET ORAL
Qty: 7 TABLET | Refills: 0 | OUTPATIENT
Start: 2020-01-25

## 2020-01-25 NOTE — PROGRESS NOTES
Pt. Present today for Gardasil #3. Per [facility]. Administered on Left Deltoid. M.A. waited with pt for 15 min with no adverse reactions. Encouraged to call with any questions or concerns. Pt verbalized understanding.      Gardasil 9  NDC: 6737-0449-13  Single do

## 2020-02-20 ENCOUNTER — OFFICE VISIT (OUTPATIENT)
Dept: FAMILY MEDICINE CLINIC | Facility: CLINIC | Age: 35
End: 2020-02-20

## 2020-02-20 VITALS
BODY MASS INDEX: 20.99 KG/M2 | DIASTOLIC BLOOD PRESSURE: 82 MMHG | WEIGHT: 126 LBS | OXYGEN SATURATION: 99 % | TEMPERATURE: 98 F | SYSTOLIC BLOOD PRESSURE: 120 MMHG | HEIGHT: 65 IN | HEART RATE: 85 BPM

## 2020-02-20 DIAGNOSIS — J02.9 SORE THROAT: ICD-10-CM

## 2020-02-20 DIAGNOSIS — J01.90 ACUTE RHINOSINUSITIS: ICD-10-CM

## 2020-02-20 DIAGNOSIS — J02.0 STREP PHARYNGITIS: Primary | ICD-10-CM

## 2020-02-20 LAB
CONTROL LINE PRESENT WITH A CLEAR BACKGROUND (YES/NO): YES YES/NO
KIT LOT #: ABNORMAL NUMERIC
STREP GRP A CUL-SCR: POSITIVE

## 2020-02-20 PROCEDURE — 87880 STREP A ASSAY W/OPTIC: CPT | Performed by: PHYSICIAN ASSISTANT

## 2020-02-20 PROCEDURE — 99213 OFFICE O/P EST LOW 20 MIN: CPT | Performed by: PHYSICIAN ASSISTANT

## 2020-02-20 RX ORDER — AMOXICILLIN 875 MG/1
875 TABLET, COATED ORAL 2 TIMES DAILY
Qty: 20 TABLET | Refills: 0 | Status: SHIPPED | OUTPATIENT
Start: 2020-02-20 | End: 2020-03-01

## 2020-02-21 NOTE — PATIENT INSTRUCTIONS
Faringitis por estreptococos (confirmada)    Jaramillo prueba lily positiva a la infección por estreptococos. Se trata de yecenia enfermedad contagiosa. La puede contagiar al toser, al besar o al tocar a otras personas después de haberse tocado la boca o la nariz.  L Atención de seguimiento  Realice el seguimiento con cheung proveedor de atención médica o nuestro personal médico, si no empieza a sentirse mejor liset la semana próxima.   Cuándo buscar atención médica  Llame de inmediato a cheung proveedor de [employer] si La cavidad nasal es el espacio ambrose lleno de aire que se encuentra detrás de cheung nariz. Los senos paranasales son un radha de espacios formados por los huesos de cheung mariya.  Se conectan con cheung cavidad nasal. La rinosinusitis bacteriana aguda hace que el teji Pueden diagnosticarle rinosinusitis bacteriana aguda si tuvo yecenia infección respiratoria superior, yeimy un resfriado, y tos por 8 días o más sin mejora o con empeoramiento de los síntomas.  Jaramillo proveedor de [employer] preguntará sobre aurora síntomas y Llame a cheung proveedor de atención médica si le sucede cualquiera de las siguientes situaciones:  · Los síntomas no mejoran, o empeoran  · Los síntomas no mejoran después de entre rosa y lorna días de antibióticos  · Dificultades para denver  · Renee Ace

## 2020-02-21 NOTE — PROGRESS NOTES
CHIEF COMPLAINT:   Patient presents with:  Cold: X 1.5 months, sore throat, cough, chills, nasal drainage       HPI:   Esteban Katz is a 37year old female who presents for upper respiratory symptoms for  7 weeks.  Patient reports sore throat, congesti HEAD: atraumatic, normocephalic.     EYES: conjunctiva clear, EOM intact  EARS: TM's non injected, ++ bulging, no retraction,slight fluid, bony landmarks diminished  NOSE: Nostrils patent, cloudy nasal discharge, nasal mucosa reddened   THROAT: Oral mucosa Jaramillo prueba lily positiva a la infección por estreptococos. Se trata de yecenia enfermedad contagiosa. La puede contagiar al toser, al besar o al tocar a otras personas después de haberse tocado la boca o la nariz.  Los síntomas incluyen dolor de garganta que empe Realice el seguimiento con cheung proveedor de atención médica o nuestro personal médico, si no empieza a sentirse mejor liset la semana próxima.   Cuándo buscar atención médica  Llame de inmediato a cheung proveedor de atención médica si algo de lo siguiente ocu La cavidad nasal es el espacio ambrose lleno de aire que se encuentra detrás de cheung nariz. Los senos paranasales son un radha de espacios formados por los huesos de cheung mariya.  Se conectan con cheung cavidad nasal. La rinosinusitis bacteriana aguda hace que el teji Pueden diagnosticarle rinosinusitis bacteriana aguda si tuvo yecenia infección respiratoria superior, yeimy un resfriado, y tos por 8 días o más sin mejora o con empeoramiento de los síntomas.  Jaramillo proveedor de [employer] preguntará sobre aurora síntomas y Llame a cheung proveedor de atención médica si le sucede cualquiera de las siguientes situaciones:  · Los síntomas no mejoran, o empeoran  · Los síntomas no mejoran después de entre rosa y lorna días de antibióticos  · Dificultades para denver  · Theador [place]

## 2020-03-10 ENCOUNTER — TELEPHONE (OUTPATIENT)
Dept: FAMILY MEDICINE CLINIC | Facility: CLINIC | Age: 35
End: 2020-03-10

## 2020-03-10 DIAGNOSIS — G89.29 CHRONIC RIGHT HIP PAIN: ICD-10-CM

## 2020-03-10 DIAGNOSIS — G89.29 CHRONIC PAIN OF BOTH KNEES: Primary | ICD-10-CM

## 2020-03-10 DIAGNOSIS — M25.562 CHRONIC PAIN OF BOTH KNEES: Primary | ICD-10-CM

## 2020-03-10 DIAGNOSIS — M25.561 CHRONIC PAIN OF BOTH KNEES: Primary | ICD-10-CM

## 2020-03-10 DIAGNOSIS — M25.551 CHRONIC RIGHT HIP PAIN: ICD-10-CM

## 2020-04-23 ENCOUNTER — PATIENT MESSAGE (OUTPATIENT)
Dept: FAMILY MEDICINE CLINIC | Facility: CLINIC | Age: 35
End: 2020-04-23

## 2020-04-23 NOTE — TELEPHONE ENCOUNTER
From: Jigar Alston  To: Jaylen Martinez MD  Sent: 4/23/2020 8:36 AM CDT  Subject: Other    [facility] :) tonya Vasquez

## 2020-05-06 ENCOUNTER — PATIENT MESSAGE (OUTPATIENT)
Dept: FAMILY MEDICINE CLINIC | Facility: CLINIC | Age: 35
End: 2020-05-06

## 2020-05-06 DIAGNOSIS — M25.562 CHRONIC PAIN OF BOTH KNEES: ICD-10-CM

## 2020-05-06 DIAGNOSIS — M25.551 CHRONIC RIGHT HIP PAIN: Primary | ICD-10-CM

## 2020-05-06 DIAGNOSIS — G89.29 CHRONIC PAIN OF BOTH KNEES: ICD-10-CM

## 2020-05-06 DIAGNOSIS — G89.29 CHRONIC RIGHT HIP PAIN: Primary | ICD-10-CM

## 2020-05-06 DIAGNOSIS — M25.561 CHRONIC PAIN OF BOTH KNEES: ICD-10-CM

## 2020-05-07 ENCOUNTER — PATIENT MESSAGE (OUTPATIENT)
Dept: FAMILY MEDICINE CLINIC | Facility: CLINIC | Age: 35
End: 2020-05-07

## 2020-05-07 NOTE — TELEPHONE ENCOUNTER
From: David Moura  To: Rosita Barcenas MD  Sent: 5/6/2020 6:44 PM CDT  Subject: Referral Request    Ervin Wilson. Me dijo el quiropráctico que ocupo más terapias que si le puede franck otro referido Para que lo cubra la aseguranza.  Porfavor

## 2020-05-08 RX ORDER — CYCLOBENZAPRINE HCL 5 MG
5 TABLET ORAL NIGHTLY PRN
Qty: 7 TABLET | Refills: 0 | Status: SHIPPED | OUTPATIENT
Start: 2020-05-08 | End: 2020-08-10 | Stop reason: ALTCHOICE

## 2020-05-08 NOTE — TELEPHONE ENCOUNTER
From: Antonio Guzmán  To: Sigrid Ugalde MD  Sent: 5/7/2020 6:22 PM CDT  Subject: Other    Sophia Blake.  Me podría hacer un refills de jovita medicamento porfavor \" CYCLOBENZAPRINE\"

## 2020-05-11 NOTE — TELEPHONE ENCOUNTER
Sent request for notes to Dr. Geoffrey Granda office. 05/08 original request; followed up today with second request as to date, office has not received notes. Need notes to submit to insurance to approve additional visits.

## 2020-05-19 NOTE — TELEPHONE ENCOUNTER
Spoke to Erica Shane at Dr. Mine Maddox office to follow-up and request notes for patient to submit to insurance to approve more visits. Fax number to Dr. Rosalia Vidal office provided. Will await notes via fax.

## 2020-05-21 ENCOUNTER — PATIENT MESSAGE (OUTPATIENT)
Dept: FAMILY MEDICINE CLINIC | Facility: CLINIC | Age: 35
End: 2020-05-21

## 2020-05-22 ENCOUNTER — TELEPHONE (OUTPATIENT)
Dept: FAMILY MEDICINE CLINIC | Facility: CLINIC | Age: 35
End: 2020-05-22

## 2020-05-22 DIAGNOSIS — H52.209 ASTIGMATISM, UNSPECIFIED LATERALITY, UNSPECIFIED TYPE: Primary | ICD-10-CM

## 2020-05-22 NOTE — TELEPHONE ENCOUNTER
Patient called requesting new referral for Ophthalmology since previous referral given , per patient she has an appointment today, I informed patient I would enter referral but that it would not be authorize that it takes a few days for approval. Pe

## 2020-05-22 NOTE — TELEPHONE ENCOUNTER
From: [place]  To: Jameson Chapin MD  Sent: 5/21/2020 9:09 PM CDT  Subject: Referral Request    Yamileth Odom.    Mañana tengo michael con el oftalmologo, Zoraida Lopez MD.   Tengo el referido josé luis me acuerdo que Usted me comento que e

## 2020-05-22 NOTE — TELEPHONE ENCOUNTER
Conversation: Referral   (Newest Message First)   Cecilia Tafoya    20 10:13 AM   Note      Patient called requesting new referral for Ophthalmology since previous referral given , per patient she has an appointment today, I informed nick

## 2020-06-04 ENCOUNTER — TELEPHONE (OUTPATIENT)
Dept: PODIATRY CLINIC | Facility: CLINIC | Age: 35
End: 2020-06-04

## 2020-06-04 NOTE — TELEPHONE ENCOUNTER
Patient indicates she has an ingrown nail on right big toe, please call with  at:[phone],ok to leave voicemail,thanks.   *informed of referral needed if appointment is needed

## 2020-06-15 ENCOUNTER — OFFICE VISIT (OUTPATIENT)
Dept: DERMATOLOGY CLINIC | Facility: CLINIC | Age: 35
End: 2020-06-15

## 2020-06-15 DIAGNOSIS — L81.1 MELASMA: Primary | ICD-10-CM

## 2020-06-15 DIAGNOSIS — L81.9 HYPERPIGMENTATION: ICD-10-CM

## 2020-06-15 PROCEDURE — 99202 OFFICE O/P NEW SF 15 MIN: CPT | Performed by: DERMATOLOGY

## 2020-06-15 RX ORDER — KETOCONAZOLE 20 MG/ML
SHAMPOO TOPICAL
Qty: 120 ML | Refills: 3 | Status: SHIPPED | OUTPATIENT
Start: 2020-06-15 | End: 2020-08-10

## 2020-06-15 NOTE — PROGRESS NOTES
HPI:     Chief Complaint     Derm Problem        HPI     Derm Problem      Additional comments: new pt. presents for hyperpigmentation to face and herb axillae and upper arms x2 years.  RXed hydroquinone 2% last year but never picked it up          Last edit mg total) by mouth daily as needed. For gastritis (Patient not taking: Reported on 6/15/2020 ) 30 tablet 2   • FLUoxetine HCl 40 MG Oral Cap Take 2 capsules (80 mg total) by mouth daily.  (Patient not taking: Reported on 6/15/2020 ) 180 capsule 0   • Levoce Fear of current or ex partner: Not on file        Emotionally abused: Not on file        Physically abused: Not on file        Forced sexual activity: Not on file    Other Topics      Concerns:        Caffeine Concern: Not Asked        Exercise: Not As and chronic rubbing beneath the axilla. Given some triamcinolone cream to be applied once to twice daily until better. Patient will follow-up in 6 months if no improvement. I told her the spots will very slowly fade and could take over a year.     No ord

## 2020-06-17 ENCOUNTER — PATIENT MESSAGE (OUTPATIENT)
Dept: FAMILY MEDICINE CLINIC | Facility: CLINIC | Age: 35
End: 2020-06-17

## 2020-06-17 DIAGNOSIS — M25.551 CHRONIC RIGHT HIP PAIN: Primary | ICD-10-CM

## 2020-06-17 DIAGNOSIS — M25.561 CHRONIC PAIN OF BOTH KNEES: ICD-10-CM

## 2020-06-17 DIAGNOSIS — G89.29 CHRONIC PAIN OF BOTH KNEES: ICD-10-CM

## 2020-06-17 DIAGNOSIS — G89.29 CHRONIC RIGHT HIP PAIN: Primary | ICD-10-CM

## 2020-06-17 DIAGNOSIS — M25.562 CHRONIC PAIN OF BOTH KNEES: ICD-10-CM

## 2020-06-17 NOTE — TELEPHONE ENCOUNTER
From: Dyane Peabody  To: Abdi Sprague MD  Sent: 6/17/2020 6:13 PM CDT  Subject: Referral Request    Buenas tardes. Hoy terminan mis 6 sesiones. Estoy en la 4 etapa y el doctor me pidió que viniera de [place].  Me podrían franck otro referi

## 2020-07-02 ENCOUNTER — TELEPHONE (OUTPATIENT)
Dept: FAMILY MEDICINE CLINIC | Facility: CLINIC | Age: 35
End: 2020-07-02

## 2020-07-02 NOTE — TELEPHONE ENCOUNTER
Pt called stating that she was just diagnosed with COVID, she was given some medications but she still having some fevers, yesterday she felt a lot of pain in her right arm that went up to her neck and back of the head.  She doesn't know if this is normal.

## 2020-07-05 ENCOUNTER — PATIENT MESSAGE (OUTPATIENT)
Dept: FAMILY MEDICINE CLINIC | Facility: CLINIC | Age: 35
End: 2020-07-05

## 2020-07-06 ENCOUNTER — VIRTUAL PHONE E/M (OUTPATIENT)
Dept: FAMILY MEDICINE CLINIC | Facility: CLINIC | Age: 35
End: 2020-07-06

## 2020-07-06 DIAGNOSIS — B34.2 CORONAVIRUS INFECTION: Primary | ICD-10-CM

## 2020-07-06 PROCEDURE — 99213 OFFICE O/P EST LOW 20 MIN: CPT | Performed by: FAMILY MEDICINE

## 2020-07-06 NOTE — PROGRESS NOTES
Virtual Telephone Check-In    David Moura verbally consents to a Virtual/Telephone Check-In visit on 07/06/20        Patient understands and accepts financial responsibility for any deductible, co-insurance and/or co-pays associated with this service in good damian to provide continuity of care in the best interest of the provider-patient relationship, due to the ongoing public health crisis/national emergency and because of restrictions of visitation.   There are limitations of this visit as no physical

## 2020-07-06 NOTE — TELEPHONE ENCOUNTER
From: Slick Puga  To: Steven Mora MD  Sent: 7/5/2020 10:48 PM CDT  Subject: Other    Danella Spillers. No se me feliz la tos. .. ya son 8 días con esta tos. Que más puedo nell?

## 2020-07-08 ENCOUNTER — PATIENT MESSAGE (OUTPATIENT)
Dept: FAMILY MEDICINE CLINIC | Facility: CLINIC | Age: 35
End: 2020-07-08

## 2020-07-09 NOTE — TELEPHONE ENCOUNTER
Patient stating she has not seen her boyfriend since being sick. Would like to know how contagious she is if she were to make contact with him, such as hug him. Pasted [facility]'s guidelines to help patients diagnosed with COVID ([place]ic form).

## 2020-07-10 ENCOUNTER — TELEPHONE (OUTPATIENT)
Dept: FAMILY MEDICINE CLINIC | Facility: CLINIC | Age: 35
End: 2020-07-10

## 2020-07-10 NOTE — TELEPHONE ENCOUNTER
Pt called stating that she was given results today for COVID testing and she is now negative. She wants to know what precautions can she take now?   Please call back and advise

## 2020-07-10 NOTE — TELEPHONE ENCOUNTER
Patient was recommended to bring us a copy of her most recent labs, she only has the one that states that she was positive and she just got a call today stating that she was negative.   Patient reports an intermittent cough but was told by Dr Kelly Harp that

## 2020-07-19 ENCOUNTER — PATIENT MESSAGE (OUTPATIENT)
Dept: FAMILY MEDICINE CLINIC | Facility: CLINIC | Age: 35
End: 2020-07-19

## 2020-07-21 ENCOUNTER — PATIENT MESSAGE (OUTPATIENT)
Dept: FAMILY MEDICINE CLINIC | Facility: CLINIC | Age: 35
End: 2020-07-21

## 2020-07-21 NOTE — TELEPHONE ENCOUNTER
Message sent back to patient recommending her to keep her appointment to be better evaluated during a consult and to go to the ER if symptoms get worse.

## 2020-07-21 NOTE — TELEPHONE ENCOUNTER
From: Lazarus Brower  To: Kendrick Ackerman MD  Sent: 7/21/2020 2:25 PM CDT  Subject: Other    Con respecto a las fotos que les mande de mi boca. Olvide preguntarles qué puedo hacer o porque tengo mi boca de un lado caída?

## 2020-07-21 NOTE — TELEPHONE ENCOUNTER
From: Esteban Katz  To: Rangel Meadows MD  Sent: 7/19/2020 5:39 PM CDT  Subject: Other    Wen Cervantes. Buenas tardes. Aquí está la hoja de prueba de covid 19 negativa :) . Me dijeron que se la [place].

## 2020-08-10 ENCOUNTER — OFFICE VISIT (OUTPATIENT)
Dept: FAMILY MEDICINE CLINIC | Facility: CLINIC | Age: 35
End: 2020-08-10

## 2020-08-10 VITALS
WEIGHT: 124 LBS | SYSTOLIC BLOOD PRESSURE: 98 MMHG | BODY MASS INDEX: 20.66 KG/M2 | DIASTOLIC BLOOD PRESSURE: 66 MMHG | HEART RATE: 62 BPM | OXYGEN SATURATION: 98 % | HEIGHT: 65 IN

## 2020-08-10 DIAGNOSIS — Z00.00 ANNUAL PHYSICAL EXAM: Primary | ICD-10-CM

## 2020-08-10 LAB
ALBUMIN SERPL-MCNC: 3.9 G/DL (ref 3.4–5)
ALBUMIN/GLOB SERPL: 1 {RATIO} (ref 1–2)
ALP LIVER SERPL-CCNC: 55 U/L (ref 37–98)
ALT SERPL-CCNC: 23 U/L (ref 13–56)
ANION GAP SERPL CALC-SCNC: 8 MMOL/L (ref 0–18)
AST SERPL-CCNC: 15 U/L (ref 15–37)
BASOPHILS # BLD AUTO: 0.09 X10(3) UL (ref 0–0.2)
BASOPHILS NFR BLD AUTO: 0.6 %
BILIRUB SERPL-MCNC: 0.4 MG/DL (ref 0.1–2)
BUN BLD-MCNC: 14 MG/DL (ref 7–18)
BUN/CREAT SERPL: 19.4 (ref 10–20)
CALCIUM BLD-MCNC: 9 MG/DL (ref 8.5–10.1)
CHLORIDE SERPL-SCNC: 109 MMOL/L (ref 98–112)
CHOLEST SMN-MCNC: 161 MG/DL (ref ?–200)
CO2 SERPL-SCNC: 22 MMOL/L (ref 21–32)
CREAT BLD-MCNC: 0.72 MG/DL (ref 0.55–1.02)
DEPRECATED RDW RBC AUTO: 42.5 FL (ref 35.1–46.3)
EOSINOPHIL # BLD AUTO: 0.14 X10(3) UL (ref 0–0.7)
EOSINOPHIL NFR BLD AUTO: 1 %
ERYTHROCYTE [DISTWIDTH] IN BLOOD BY AUTOMATED COUNT: 12.7 % (ref 11–15)
GLOBULIN PLAS-MCNC: 4 G/DL (ref 2.8–4.4)
GLUCOSE BLD-MCNC: 89 MG/DL (ref 70–99)
HCT VFR BLD AUTO: 39.6 % (ref 35–48)
HDLC SERPL-MCNC: 60 MG/DL (ref 40–59)
HGB BLD-MCNC: 13.4 G/DL (ref 12–16)
IMM GRANULOCYTES # BLD AUTO: 0.06 X10(3) UL (ref 0–1)
IMM GRANULOCYTES NFR BLD: 0.4 %
LDLC SERPL CALC-MCNC: 88 MG/DL (ref ?–100)
LYMPHOCYTES # BLD AUTO: 2.63 X10(3) UL (ref 1–4)
LYMPHOCYTES NFR BLD AUTO: 17.9 %
M PROTEIN MFR SERPL ELPH: 7.9 G/DL (ref 6.4–8.2)
MCH RBC QN AUTO: 31.2 PG (ref 26–34)
MCHC RBC AUTO-ENTMCNC: 33.8 G/DL (ref 31–37)
MCV RBC AUTO: 92.3 FL (ref 80–100)
MONOCYTES # BLD AUTO: 0.72 X10(3) UL (ref 0.1–1)
MONOCYTES NFR BLD AUTO: 4.9 %
NEUTROPHILS # BLD AUTO: 11.05 X10 (3) UL (ref 1.5–7.7)
NEUTROPHILS # BLD AUTO: 11.05 X10(3) UL (ref 1.5–7.7)
NEUTROPHILS NFR BLD AUTO: 75.2 %
NONHDLC SERPL-MCNC: 101 MG/DL (ref ?–130)
OSMOLALITY SERPL CALC.SUM OF ELEC: 288 MOSM/KG (ref 275–295)
PATIENT FASTING Y/N/NP: NO
PATIENT FASTING Y/N/NP: NO
PLATELET # BLD AUTO: 347 10(3)UL (ref 150–450)
POTASSIUM SERPL-SCNC: 3.6 MMOL/L (ref 3.5–5.1)
RBC # BLD AUTO: 4.29 X10(6)UL (ref 3.8–5.3)
SODIUM SERPL-SCNC: 139 MMOL/L (ref 136–145)
TRIGL SERPL-MCNC: 64 MG/DL (ref 30–149)
VLDLC SERPL CALC-MCNC: 13 MG/DL (ref 0–30)
WBC # BLD AUTO: 14.7 X10(3) UL (ref 4–11)

## 2020-08-10 PROCEDURE — 3008F BODY MASS INDEX DOCD: CPT | Performed by: INTERNAL MEDICINE

## 2020-08-10 PROCEDURE — 80061 LIPID PANEL: CPT | Performed by: INTERNAL MEDICINE

## 2020-08-10 PROCEDURE — 99395 PREV VISIT EST AGE 18-39: CPT | Performed by: INTERNAL MEDICINE

## 2020-08-10 PROCEDURE — 36415 COLL VENOUS BLD VENIPUNCTURE: CPT | Performed by: INTERNAL MEDICINE

## 2020-08-10 PROCEDURE — G0438 PPPS, INITIAL VISIT: HCPCS | Performed by: INTERNAL MEDICINE

## 2020-08-10 PROCEDURE — 86580 TB INTRADERMAL TEST: CPT | Performed by: INTERNAL MEDICINE

## 2020-08-10 PROCEDURE — 80053 COMPREHEN METABOLIC PANEL: CPT | Performed by: INTERNAL MEDICINE

## 2020-08-10 PROCEDURE — 85025 COMPLETE CBC W/AUTO DIFF WBC: CPT | Performed by: INTERNAL MEDICINE

## 2020-08-10 PROCEDURE — 3078F DIAST BP <80 MM HG: CPT | Performed by: INTERNAL MEDICINE

## 2020-08-10 PROCEDURE — 3074F SYST BP LT 130 MM HG: CPT | Performed by: INTERNAL MEDICINE

## 2020-08-10 NOTE — PROGRESS NOTES
[facility]  Return Patient Progress Note      HPI:   Patient presents with:  Physical: yearly exam      Laila Barry is a 38year old female presenting for: yearly exam and work physical.      Has a significant  has a past medic Tab Take 1 tablet by mouth daily.         PMH:  Past Medical History:   Diagnosis Date   • Anxiety    • Cervicalgia 04/27/2020   • Contracture of muscle 04/27/2020   • Low back pain 04/27/2020   • Muscle weakness 04/27/2020   • Pain in thoracic spine 04/27/ weakness, light-headedness, numbness and headaches. Hematological: Negative for adenopathy. Does not bruise/bleed easily. Psychiatric/Behavioral: Negative for suicidal ideas, behavioral problems and sleep disturbance.  The patient is not nervous/anxious No erythema. Psychiatric: She has a normal mood and affect. Her behavior is normal. Judgment and thought content normal.           ASSESSMENT AND PLAN:   Patient is a 38year old female who presents primarily presents for:    1.  Annual physical exam  - C

## 2020-08-11 PROBLEM — H05.20 EXOPHTHALMOS: Status: ACTIVE | Noted: 2020-08-11

## 2020-08-11 PROBLEM — H04.129 DRY EYE: Status: ACTIVE | Noted: 2020-08-11

## 2020-08-12 ENCOUNTER — HOSPITAL ENCOUNTER (OUTPATIENT)
Dept: GENERAL RADIOLOGY | Facility: HOSPITAL | Age: 35
Discharge: HOME | End: 2020-08-12
Attending: INTERNAL MEDICINE
Payer: COMMERCIAL

## 2020-08-12 ENCOUNTER — APPOINTMENT (OUTPATIENT)
Dept: LAB | Facility: HOSPITAL | Age: 35
End: 2020-08-12
Attending: INTERNAL MEDICINE
Payer: COMMERCIAL

## 2020-08-12 ENCOUNTER — NURSE ONLY (OUTPATIENT)
Dept: FAMILY MEDICINE CLINIC | Facility: CLINIC | Age: 35
End: 2020-08-12

## 2020-08-12 DIAGNOSIS — R76.11 POSITIVE REACTION TO TUBERCULIN SKIN TEST: ICD-10-CM

## 2020-08-12 DIAGNOSIS — R76.11 POSITIVE TB TEST: ICD-10-CM

## 2020-08-12 DIAGNOSIS — R76.11 POSITIVE REACTION TO TUBERCULIN SKIN TEST: Primary | ICD-10-CM

## 2020-08-12 LAB — INDURATION (): 25.4 MM (ref 0–11)

## 2020-08-12 PROCEDURE — 86480 TB TEST CELL IMMUN MEASURE: CPT

## 2020-08-12 PROCEDURE — 36415 COLL VENOUS BLD VENIPUNCTURE: CPT

## 2020-08-12 PROCEDURE — 71046 X-RAY EXAM CHEST 2 VIEWS: CPT | Performed by: INTERNAL MEDICINE

## 2020-08-14 ENCOUNTER — LAB ENCOUNTER (OUTPATIENT)
Dept: LAB | Facility: HOSPITAL | Age: 35
End: 2020-08-14
Attending: OBSTETRICS & GYNECOLOGY
Payer: COMMERCIAL

## 2020-08-14 ENCOUNTER — OFFICE VISIT (OUTPATIENT)
Dept: OBGYN CLINIC | Facility: CLINIC | Age: 35
End: 2020-08-14

## 2020-08-14 VITALS
DIASTOLIC BLOOD PRESSURE: 61 MMHG | SYSTOLIC BLOOD PRESSURE: 110 MMHG | HEART RATE: 77 BPM | WEIGHT: 123 LBS | HEIGHT: 65 IN | BODY MASS INDEX: 20.49 KG/M2

## 2020-08-14 DIAGNOSIS — R87.610 ASCUS WITH POSITIVE HIGH RISK HPV CERVICAL: ICD-10-CM

## 2020-08-14 DIAGNOSIS — Z01.419 ENCOUNTER FOR GYNECOLOGICAL EXAMINATION WITHOUT ABNORMAL FINDING: Primary | ICD-10-CM

## 2020-08-14 DIAGNOSIS — R87.810 ASCUS WITH POSITIVE HIGH RISK HPV CERVICAL: ICD-10-CM

## 2020-08-14 DIAGNOSIS — Z11.3 SCREEN FOR STD (SEXUALLY TRANSMITTED DISEASE): ICD-10-CM

## 2020-08-14 LAB
HBV SURFACE AG SER-ACNC: <0.1 [IU]/L
HBV SURFACE AG SERPL QL IA: NONREACTIVE
HCV AB SERPL QL IA: NONREACTIVE
T PALLIDUM AB SER QL: NEGATIVE

## 2020-08-14 PROCEDURE — 86803 HEPATITIS C AB TEST: CPT

## 2020-08-14 PROCEDURE — 99395 PREV VISIT EST AGE 18-39: CPT | Performed by: OBSTETRICS & GYNECOLOGY

## 2020-08-14 PROCEDURE — 87389 HIV-1 AG W/HIV-1&-2 AB AG IA: CPT

## 2020-08-14 PROCEDURE — 3008F BODY MASS INDEX DOCD: CPT | Performed by: OBSTETRICS & GYNECOLOGY

## 2020-08-14 PROCEDURE — 3078F DIAST BP <80 MM HG: CPT | Performed by: OBSTETRICS & GYNECOLOGY

## 2020-08-14 PROCEDURE — 3074F SYST BP LT 130 MM HG: CPT | Performed by: OBSTETRICS & GYNECOLOGY

## 2020-08-14 PROCEDURE — 36415 COLL VENOUS BLD VENIPUNCTURE: CPT

## 2020-08-14 PROCEDURE — 87340 HEPATITIS B SURFACE AG IA: CPT

## 2020-08-14 PROCEDURE — 86780 TREPONEMA PALLIDUM: CPT

## 2020-08-15 LAB
QFT MITOGEN MINUS NIL: 7.76 IU/ML
QUANTIFERON NIL: 0.03 IU/ML
QUANTIFERON PLUS TB1 MINUS NIL: 0 IU/ML
QUANTIFERON PLUS TB2 MINUS NIL: 0.01 IU/ML
QUANTIFERON TB GOLD PLUS: NEGATIVE

## 2020-08-15 NOTE — PROGRESS NOTES
Juan C Corona is a 38year old female [facility] Patient's last menstrual period was 07/22/2020. Patient presents with:  Gyn Exam: ANNUAL EXAM  Follow Up Pap: hx ASCUS (+) HPV but neg colpo 2019  Std Screen: wishes for full screen  .     OBSTETRICS HISTORY Attends [employer] service: Not on file        Active member of club or organization: Not on file        Attends meetings of clubs or organizations: Not on file        Relationship status: Not on file      Intimate partner violence:        Fear of current vomiting  Genitourinary:    denies dysuria, bothersome incontinence  Skin/Breast:   denies any breast pain, lumps, or discharge  Neurological:    denies frequent severe headaches  Psychiatric:   denies depression or anxiety, thoughts of harming self or oth THINPREP PAP SMEAR B; Future  -     HPV HIGH RISK , THIN PREP COLLECTION;  Future  -     THINPREP PAP SMEAR B  -     HPV HIGH RISK , THIN PREP COLLECTION  -     THINPREP PAP SMEAR ONLY    Screen for STD (sexually transmitted disease)  -     HCV ANTIBODY;

## 2020-08-16 ENCOUNTER — HOSPITAL ENCOUNTER (OUTPATIENT)
Age: 35
Discharge: HOME | End: 2020-08-16
Attending: EMERGENCY MEDICINE
Payer: COMMERCIAL

## 2020-08-16 VITALS
TEMPERATURE: 98 F | BODY MASS INDEX: 20.83 KG/M2 | WEIGHT: 125 LBS | HEIGHT: 65 IN | SYSTOLIC BLOOD PRESSURE: 118 MMHG | HEART RATE: 67 BPM | DIASTOLIC BLOOD PRESSURE: 61 MMHG | OXYGEN SATURATION: 100 % | RESPIRATION RATE: 14 BRPM

## 2020-08-16 DIAGNOSIS — S01.552A DOG BITE OF MOUTH, INITIAL ENCOUNTER: Primary | ICD-10-CM
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
## 2020-08-16 PROCEDURE — 90471 IMMUNIZATION ADMIN: CPT | Performed by: EMERGENCY MEDICINE

## 2020-08-16 PROCEDURE — 99214 OFFICE O/P EST MOD 30 MIN: CPT | Performed by: EMERGENCY MEDICINE

## 2020-08-16 PROCEDURE — 90715 TDAP VACCINE 7 YRS/> IM: CPT | Performed by: EMERGENCY MEDICINE

## 2020-08-16 RX ORDER — AMOXICILLIN AND CLAVULANATE POTASSIUM 875; 125 MG/1; MG/1
1 TABLET, FILM COATED ORAL 2 TIMES DAILY
Qty: 28 TABLET | Refills: 0 | Status: SHIPPED | OUTPATIENT
Start: 2020-08-16 | End: 2020-08-30

## 2020-08-16 NOTE — ED PROVIDER NOTES
Patient Seen in: [facility] In [address] [place] [address]    History   Patient presents with:  Bite    Stated Complaint: dog bite     HPI    Patient complains of dog bite to the inner upper lip that happened yesterday, patient complains of pain now t reviewed and negative except as noted above. PSFH elements reviewed from today and agreed except as otherwise stated in HPI.     Physical Exam     ED Triage Vitals [08/16/20 1444]   /61   Pulse 67   Resp 14   Temp 98.4 °F (36.9 °C)   Temp src Tempo Impression:  Dog bite of mouth, initial encounter  (primary encounter diagnosis)    Disposition:  Discharge    Follow-up:  Homero Barnett, [address] [address] [phone]    Schedule an appointment as soon as cyndi

## 2020-08-17 LAB
C TRACH DNA SPEC QL NAA+PROBE: NEGATIVE
HPV I/H RISK 1 DNA SPEC QL NAA+PROBE: NEGATIVE
N GONORRHOEA DNA SPEC QL NAA+PROBE: NEGATIVE
T VAGINALIS RRNA SPEC QL NAA+PROBE: NEGATIVE

## 2020-08-21 ENCOUNTER — TELEPHONE (OUTPATIENT)
Dept: OBGYN CLINIC | Facility: CLINIC | Age: 35
End: 2020-08-21

## 2020-08-25 ENCOUNTER — TELEPHONE (OUTPATIENT)
Dept: FAMILY MEDICINE CLINIC | Facility: CLINIC | Age: 35
End: 2020-08-25

## 2020-08-25 NOTE — TELEPHONE ENCOUNTER
Patient needs to be seen to be evaluated she can see Dr Napoleon Huynh.     Called no answer, left message for her to call back

## 2020-08-25 NOTE — TELEPHONE ENCOUNTER
Patient is complaining of lower leg pain, tired, no energy, weakness. No other symptoms but would like to speak to a nurse as soon as possible  Please call and advise       Patient is taking OTC vitamins.

## 2020-08-27 NOTE — TELEPHONE ENCOUNTER
Called patient with help of MG for translation purposes and informed her of below. TS completed by MG. Scheduled for Monday 8/31 at 4:20 with Dr. Christ Torre.

## 2020-08-31 ENCOUNTER — OFFICE VISIT (OUTPATIENT)
Dept: FAMILY MEDICINE CLINIC | Facility: CLINIC | Age: 35
End: 2020-08-31

## 2020-08-31 VITALS
HEART RATE: 76 BPM | DIASTOLIC BLOOD PRESSURE: 70 MMHG | WEIGHT: 122 LBS | OXYGEN SATURATION: 99 % | SYSTOLIC BLOOD PRESSURE: 96 MMHG | BODY MASS INDEX: 20 KG/M2

## 2020-08-31 DIAGNOSIS — R53.83 OTHER FATIGUE: Primary | ICD-10-CM

## 2020-08-31 LAB
T4 FREE SERPL-MCNC: 1.1 NG/DL (ref 0.8–1.7)
TSI SER-ACNC: 1.09 MIU/ML (ref 0.36–3.74)

## 2020-08-31 PROCEDURE — 3078F DIAST BP <80 MM HG: CPT | Performed by: INTERNAL MEDICINE

## 2020-08-31 PROCEDURE — 90471 IMMUNIZATION ADMIN: CPT | Performed by: INTERNAL MEDICINE

## 2020-08-31 PROCEDURE — 3074F SYST BP LT 130 MM HG: CPT | Performed by: INTERNAL MEDICINE

## 2020-08-31 PROCEDURE — 36415 COLL VENOUS BLD VENIPUNCTURE: CPT | Performed by: INTERNAL MEDICINE

## 2020-08-31 PROCEDURE — 84443 ASSAY THYROID STIM HORMONE: CPT | Performed by: INTERNAL MEDICINE

## 2020-08-31 PROCEDURE — 99213 OFFICE O/P EST LOW 20 MIN: CPT | Performed by: INTERNAL MEDICINE

## 2020-08-31 PROCEDURE — 84439 ASSAY OF FREE THYROXINE: CPT | Performed by: INTERNAL MEDICINE

## 2020-08-31 PROCEDURE — 82306 VITAMIN D 25 HYDROXY: CPT | Performed by: INTERNAL MEDICINE

## 2020-08-31 PROCEDURE — 90707 MMR VACCINE SC: CPT | Performed by: INTERNAL MEDICINE

## 2020-08-31 NOTE — TELEPHONE ENCOUNTER
Pt wants to make sure her Pap and STD results are normal. Reassured pt yes all results are negative and Pap is normal and neg HPV with recs for annual in one year. Pt agrees.

## 2020-09-01 NOTE — PROGRESS NOTES
[facility]  Return Patient Progress Note      HPI:   Patient presents with:  Fatigue      Ludwig Jessica is a 38year old female presenting for: fatigue and MMR vaccine needed for employment. Works at a .       Has a signif Omega-3-acid Ethyl Esters 1 g Oral Cap Take 1 g by mouth daily. • Multiple Vitamins-Minerals (MULTI-VITAMIN/MINERALS) Oral Tab Take 1 tablet by mouth daily.      • Levocetirizine Dihydrochloride 5 MG Oral Tab Take 1 tablet (5 mg total) by mouth every ev Negative for dysuria, urgency and hematuria. Musculoskeletal: Negative for myalgias, back pain, joint swelling, joint pain, gait problem and neck pain. Skin: Negative for rash and wound.    Allergic/Immunologic: Negative for food allergies and immunocom tenderness. There is no rebound and no guarding. Musculoskeletal: Normal range of motion. She exhibits no tenderness. Lymphadenopathy:     She has no cervical adenopathy. Neurological: She is alert and oriented to person, place, and time.  She has keila

## 2020-09-04 LAB — 25(OH)D3 SERPL-MCNC: 35.3 NG/ML (ref 30–100)

## 2020-10-12 ENCOUNTER — TELEPHONE (OUTPATIENT)
Dept: FAMILY MEDICINE CLINIC | Facility: CLINIC | Age: 35
End: 2020-10-12

## 2020-10-12 NOTE — TELEPHONE ENCOUNTER
Pt called requesting for a Emotional Support Form to be filled out. She has a puppy and will be traveling out in December and she doesn't want to go with out the puppy, it helps her emotionally and does not want to leave behind.    Please call back and negrita

## 2020-12-08 NOTE — PROGRESS NOTES
[facility]  Return Patient Progress Note      HPI:   Patient presents with:  Paperwork: would like to get a note for her dog to be a suportive animal due to her anxiety      Ardell Brunner is a 38year old female presenting for: f Take 1 g by mouth daily. • Multiple Vitamins-Minerals (MULTI-VITAMIN/MINERALS) Oral Tab Take 1 tablet by mouth daily.         PMH:  Past Medical History:   Diagnosis Date   • Anxiety    • Cervicalgia 04/27/2020   • Contracture of muscle 04/27/2020   • D allergies and immunocompromised state. Neurological: Negative for dizziness, seizures, facial asymmetry, speech difficulty, weakness, light-headedness, numbness and headaches. Hematological: Negative for adenopathy. Does not bruise/bleed easily.    Psyc Neurological: She is alert and oriented to person, place, and time. She has normal reflexes. No cranial nerve deficit. Skin: Skin is warm. No rash noted. No erythema. Psychiatric: She has a normal mood and affect.  Her behavior is normal. Judgment and

## 2021-06-05 ENCOUNTER — OFFICE VISIT (OUTPATIENT)
Dept: DERMATOLOGY CLINIC | Facility: CLINIC | Age: 36
End: 2021-06-05
Payer: COMMERCIAL

## 2021-06-05 DIAGNOSIS — L81.1 MELASMA: Primary | ICD-10-CM

## 2021-06-05 PROCEDURE — 99213 OFFICE O/P EST LOW 20 MIN: CPT | Performed by: DERMATOLOGY

## 2021-06-05 RX ORDER — FLUOCINOLONE ACETONIDE, HYDROQUINONE, AND TRETINOIN .1; 40; .5 MG/G; MG/G; MG/G
1 CREAM TOPICAL NIGHTLY
Qty: 30 G | Refills: 2 | Status: SHIPPED | OUTPATIENT
Start: 2021-06-05

## 2021-06-05 NOTE — PROGRESS NOTES
HPI:     Chief Complaint     Derm Problem        HPI     Derm Problem      Additional comments: LOV 6/16/2020 - Pt presenting for hyperpigmentation on forehead and upper lip.   Pt states she was applying the hydroquinone on face for over 6 months but state Spouse name: Not on file      Number of children: Not on file      Years of education: Not on file      Highest education level: Not on file    Occupational History      Not on file    Tobacco Use      Smoking status: Never Smoker      Smokeless tobacco: N disease Paternal Grandmother    • Cancer Paternal Grandfather         throat cancer -- smoker   • Heart Disorder Neg    • Hypertension Neg    • Lipids Neg        PHYSICAL EXAM:   Patient is alert and oriented and appears their stated age.   They are well-no

## 2021-06-11 ENCOUNTER — PATIENT MESSAGE (OUTPATIENT)
Dept: FAMILY MEDICINE CLINIC | Facility: CLINIC | Age: 36
End: 2021-06-11

## 2021-06-24 ENCOUNTER — PATIENT MESSAGE (OUTPATIENT)
Dept: FAMILY MEDICINE CLINIC | Facility: CLINIC | Age: 36
End: 2021-06-24

## 2021-06-24 DIAGNOSIS — H05.20 EXOPHTHALMOS: Primary | ICD-10-CM

## 2021-06-29 NOTE — TELEPHONE ENCOUNTER
From: Laila Barry  To: Jennifer Ayala MD  Sent: 6/24/2021 2:49 PM CDT  Subject: Other    Buenas tardes.    Disculpe obed Beth, hace unas semanas les mande un msj preguntándoles si pudieron obtener la carta del  Doctor oftalmologo Flori

## 2021-06-29 NOTE — TELEPHONE ENCOUNTER
From: Irene Sheppard  To: Kayley Dennis MD  Sent: 6/11/2021 8:23 AM CDT  Subject: Non-Urgent Medical Question    Dr. Eugenio Arias.    Disculpe, solo escribo para preguntar si ya pudieron comunicarse con el oftalmologo, Dr. Favio Perez

## 2021-07-16 ENCOUNTER — PATIENT MESSAGE (OUTPATIENT)
Dept: FAMILY MEDICINE CLINIC | Facility: CLINIC | Age: 36
End: 2021-07-16

## 2021-07-16 NOTE — TELEPHONE ENCOUNTER
From: Nonda Go  To: Carissa Rivera MD  Sent: 7/16/2021 12:32 PM CDT  Subject: Other    Buenas tardes. Disculpa he estado llamando a con en Dr. Marisol Blanco, josé luis no me contesta. . no se si será el número correcto o que es

## 2021-07-16 NOTE — TELEPHONE ENCOUNTER
Our Location  [facility]. AT [address] [address] [facility], [address] [place] [address]  Phone: [phone]  Fax: [phone]

## 2022-04-25 NOTE — TELEPHONE ENCOUNTER
----- Message from Shelly Hamilton MD sent at 2/26/2019  1:01 PM CST -----  Please let her know that her thyroid and blood count are normal thus not the cause of her irregular periods.  Once we get the ultrasound we will let her know the results
Called pt,  verified, Results below given
Home

## 2022-04-27 ENCOUNTER — OFFICE VISIT (OUTPATIENT)
Dept: FAMILY MEDICINE CLINIC | Facility: CLINIC | Age: 37
End: 2022-04-27
Payer: COMMERCIAL

## 2022-04-27 ENCOUNTER — LAB ENCOUNTER (OUTPATIENT)
Dept: LAB | Age: 37
End: 2022-04-27
Attending: FAMILY MEDICINE
Payer: COMMERCIAL

## 2022-04-27 VITALS
SYSTOLIC BLOOD PRESSURE: 120 MMHG | HEIGHT: 66 IN | DIASTOLIC BLOOD PRESSURE: 80 MMHG | RESPIRATION RATE: 18 BRPM | HEART RATE: 78 BPM | WEIGHT: 129 LBS | OXYGEN SATURATION: 100 % | BODY MASS INDEX: 20.73 KG/M2

## 2022-04-27 DIAGNOSIS — R79.9 ABNORMAL BLOOD CHEMISTRY: ICD-10-CM

## 2022-04-27 DIAGNOSIS — E55.9 VITAMIN D DEFICIENCY: ICD-10-CM

## 2022-04-27 DIAGNOSIS — Z00.00 ROUTINE HEALTH MAINTENANCE: ICD-10-CM

## 2022-04-27 DIAGNOSIS — E07.9 THYROID DISEASE: ICD-10-CM

## 2022-04-27 DIAGNOSIS — E55.9 VITAMIN D DEFICIENCY: Primary | ICD-10-CM

## 2022-04-27 LAB
ALBUMIN SERPL-MCNC: 3.2 G/DL (ref 3.4–5)
ALBUMIN/GLOB SERPL: 0.8 {RATIO} (ref 1–2)
ALP LIVER SERPL-CCNC: 43 U/L
ALT SERPL-CCNC: 36 U/L
ANION GAP SERPL CALC-SCNC: 4 MMOL/L (ref 0–18)
AST SERPL-CCNC: 26 U/L (ref 15–37)
BASOPHILS # BLD AUTO: 0.03 X10(3) UL (ref 0–0.2)
BASOPHILS NFR BLD AUTO: 0.6 %
BILIRUB SERPL-MCNC: 0.2 MG/DL (ref 0.1–2)
BUN BLD-MCNC: 15 MG/DL (ref 7–18)
BUN/CREAT SERPL: 21.1 (ref 10–20)
CALCIUM BLD-MCNC: 8.9 MG/DL (ref 8.5–10.1)
CHLORIDE SERPL-SCNC: 106 MMOL/L (ref 98–112)
CHOLEST SERPL-MCNC: 128 MG/DL (ref ?–200)
CO2 SERPL-SCNC: 30 MMOL/L (ref 21–32)
CREAT BLD-MCNC: 0.71 MG/DL
DEPRECATED RDW RBC AUTO: 44.6 FL (ref 35.1–46.3)
EOSINOPHIL # BLD AUTO: 0.05 X10(3) UL (ref 0–0.7)
EOSINOPHIL NFR BLD AUTO: 1 %
ERYTHROCYTE [DISTWIDTH] IN BLOOD BY AUTOMATED COUNT: 12.5 % (ref 11–15)
FASTING PATIENT LIPID ANSWER: NO
FASTING STATUS PATIENT QL REPORTED: NO
GLOBULIN PLAS-MCNC: 3.8 G/DL (ref 2.8–4.4)
GLUCOSE BLD-MCNC: 112 MG/DL (ref 70–99)
HCT VFR BLD AUTO: 38.6 %
HDLC SERPL-MCNC: 43 MG/DL (ref 40–59)
HGB BLD-MCNC: 12.4 G/DL
IMM GRANULOCYTES # BLD AUTO: 0 X10(3) UL (ref 0–1)
IMM GRANULOCYTES NFR BLD: 0 %
LDLC SERPL CALC-MCNC: 64 MG/DL (ref ?–100)
LYMPHOCYTES # BLD AUTO: 1.36 X10(3) UL (ref 1–4)
LYMPHOCYTES NFR BLD AUTO: 27.9 %
MCH RBC QN AUTO: 30.9 PG (ref 26–34)
MCHC RBC AUTO-ENTMCNC: 32.1 G/DL (ref 31–37)
MCV RBC AUTO: 96.3 FL
MONOCYTES # BLD AUTO: 0.33 X10(3) UL (ref 0.1–1)
MONOCYTES NFR BLD AUTO: 6.8 %
NEUTROPHILS # BLD AUTO: 3.1 X10 (3) UL (ref 1.5–7.7)
NEUTROPHILS # BLD AUTO: 3.1 X10(3) UL (ref 1.5–7.7)
NEUTROPHILS NFR BLD AUTO: 63.7 %
NONHDLC SERPL-MCNC: 85 MG/DL (ref ?–130)
OSMOLALITY SERPL CALC.SUM OF ELEC: 292 MOSM/KG (ref 275–295)
PLATELET # BLD AUTO: 256 10(3)UL (ref 150–450)
POTASSIUM SERPL-SCNC: 3.7 MMOL/L (ref 3.5–5.1)
PROT SERPL-MCNC: 7 G/DL (ref 6.4–8.2)
RBC # BLD AUTO: 4.01 X10(6)UL
SODIUM SERPL-SCNC: 140 MMOL/L (ref 136–145)
T4 FREE SERPL-MCNC: 1.2 NG/DL (ref 0.8–1.7)
TRIGL SERPL-MCNC: 117 MG/DL (ref 30–149)
TSI SER-ACNC: 1.34 MIU/ML (ref 0.36–3.74)
VIT D+METAB SERPL-MCNC: 42.5 NG/ML (ref 30–100)
VLDLC SERPL CALC-MCNC: 17 MG/DL (ref 0–30)
WBC # BLD AUTO: 4.9 X10(3) UL (ref 4–11)

## 2022-04-27 PROCEDURE — 99385 PREV VISIT NEW AGE 18-39: CPT | Performed by: FAMILY MEDICINE

## 2022-04-27 PROCEDURE — 82306 VITAMIN D 25 HYDROXY: CPT

## 2022-04-27 PROCEDURE — 36415 COLL VENOUS BLD VENIPUNCTURE: CPT | Performed by: FAMILY MEDICINE

## 2022-04-27 PROCEDURE — 84443 ASSAY THYROID STIM HORMONE: CPT

## 2022-04-27 PROCEDURE — 3008F BODY MASS INDEX DOCD: CPT | Performed by: FAMILY MEDICINE

## 2022-04-27 PROCEDURE — 85025 COMPLETE CBC W/AUTO DIFF WBC: CPT

## 2022-04-27 PROCEDURE — 84439 ASSAY OF FREE THYROXINE: CPT

## 2022-04-27 PROCEDURE — 3079F DIAST BP 80-89 MM HG: CPT | Performed by: FAMILY MEDICINE

## 2022-04-27 PROCEDURE — 80061 LIPID PANEL: CPT

## 2022-04-27 PROCEDURE — 3074F SYST BP LT 130 MM HG: CPT | Performed by: FAMILY MEDICINE

## 2022-04-27 PROCEDURE — 80053 COMPREHEN METABOLIC PANEL: CPT | Performed by: FAMILY MEDICINE

## 2022-08-24 ENCOUNTER — OFFICE VISIT (OUTPATIENT)
Dept: INTERNAL MEDICINE CLINIC | Facility: CLINIC | Age: 37
End: 2022-08-24
Payer: COMMERCIAL

## 2022-08-24 VITALS
HEART RATE: 90 BPM | SYSTOLIC BLOOD PRESSURE: 100 MMHG | OXYGEN SATURATION: 98 % | HEIGHT: 65 IN | DIASTOLIC BLOOD PRESSURE: 60 MMHG | WEIGHT: 129 LBS | BODY MASS INDEX: 21.49 KG/M2 | TEMPERATURE: 98 F

## 2022-08-24 DIAGNOSIS — Z01.00 EYE EXAM, ROUTINE: ICD-10-CM

## 2022-08-24 DIAGNOSIS — R00.2 PALPITATIONS: ICD-10-CM

## 2022-08-24 DIAGNOSIS — F41.1 GAD (GENERALIZED ANXIETY DISORDER): ICD-10-CM

## 2022-08-24 DIAGNOSIS — R73.01 IMPAIRED FASTING GLUCOSE: ICD-10-CM

## 2022-08-24 DIAGNOSIS — M25.50 POLYARTHRALGIA: ICD-10-CM

## 2022-08-24 DIAGNOSIS — N94.10 DYSPAREUNIA, FEMALE: Primary | ICD-10-CM

## 2022-08-24 DIAGNOSIS — Z88.9 MULTIPLE ALLERGIES: ICD-10-CM

## 2022-08-24 DIAGNOSIS — Z12.31 ENCOUNTER FOR SCREENING MAMMOGRAM FOR MALIGNANT NEOPLASM OF BREAST: ICD-10-CM

## 2022-08-24 DIAGNOSIS — Z02.1 ENCOUNTER FOR PRE-EMPLOYMENT EXAMINATION: ICD-10-CM

## 2022-08-24 RX ORDER — FLUOXETINE HYDROCHLORIDE 20 MG/1
20 CAPSULE ORAL DAILY
Qty: 90 CAPSULE | Refills: 0 | Status: SHIPPED | OUTPATIENT
Start: 2022-08-24

## 2022-08-24 RX ORDER — MAGNESIUM OXIDE 400 MG (241.3 MG MAGNESIUM) TABLET
100 TABLET DAILY
COMMUNITY

## 2022-08-24 RX ORDER — AMOXICILLIN 250 MG
CAPSULE ORAL
COMMUNITY

## 2022-08-27 ENCOUNTER — LAB ENCOUNTER (OUTPATIENT)
Dept: LAB | Facility: HOSPITAL | Age: 37
End: 2022-08-27
Attending: FAMILY MEDICINE
Payer: COMMERCIAL

## 2022-08-27 DIAGNOSIS — Z00.00 PHYSICAL EXAM, ROUTINE: ICD-10-CM

## 2022-08-27 DIAGNOSIS — Z88.9 MULTIPLE ALLERGIES: ICD-10-CM

## 2022-08-27 DIAGNOSIS — R73.01 IMPAIRED FASTING GLUCOSE: ICD-10-CM

## 2022-08-27 DIAGNOSIS — R00.2 PALPITATIONS: ICD-10-CM

## 2022-08-27 LAB
EST. AVERAGE GLUCOSE BLD GHB EST-MCNC: 117 MG/DL (ref 68–126)
HBA1C MFR BLD: 5.7 % (ref ?–5.7)
RUBV IGG SER QL: POSITIVE
RUBV IGG SER-ACNC: 86.4 IU/ML (ref 10–?)

## 2022-08-27 PROCEDURE — 86765 RUBEOLA ANTIBODY: CPT

## 2022-08-27 PROCEDURE — 82785 ASSAY OF IGE: CPT

## 2022-08-27 PROCEDURE — 86735 MUMPS ANTIBODY: CPT

## 2022-08-27 PROCEDURE — 86762 RUBELLA ANTIBODY: CPT

## 2022-08-27 PROCEDURE — 36415 COLL VENOUS BLD VENIPUNCTURE: CPT

## 2022-08-27 PROCEDURE — 86480 TB TEST CELL IMMUN MEASURE: CPT

## 2022-08-27 PROCEDURE — 86003 ALLG SPEC IGE CRUDE XTRC EA: CPT

## 2022-08-27 PROCEDURE — 93010 ELECTROCARDIOGRAM REPORT: CPT | Performed by: FAMILY MEDICINE

## 2022-08-27 PROCEDURE — 93005 ELECTROCARDIOGRAM TRACING: CPT

## 2022-08-27 PROCEDURE — 83036 HEMOGLOBIN GLYCOSYLATED A1C: CPT

## 2022-08-29 LAB
M TB IFN-G CD4+ T-CELLS BLD-ACNC: 0.17 IU/ML
M TB TUBERC IFN-G BLD QL: NEGATIVE
M TB TUBERC IGNF/MITOGEN IGNF CONTROL: >10 IU/ML
MEV IGG SER-ACNC: 252 AU/ML (ref 16.5–?)
MUV IGG SER IA-ACNC: 85.4 AU/ML (ref 11–?)
QFT TB1 AG MINUS NIL: -0.08 IU/ML
QFT TB2 AG MINUS NIL: 0.3 IU/ML

## 2022-09-01 LAB
A ALTERNATA IGE QN: <0.1 KUA/L (ref ?–0.1)
A FUMIGATUS IGE QN: <0.1 KUA/L (ref ?–0.1)
AMER SYCAMORE IGE QN: <0.1 KUA/L (ref ?–0.1)
BERMUDA GRASS IGE QN: <0.1 KUA/L (ref ?–0.1)
BOXELDER IGE QN: <0.1 KUA/L (ref ?–0.1)
C HERBARUM IGE QN: <0.1 KUA/L (ref ?–0.1)
CALIF WALNUT IGE QN: <0.1 KUA/L (ref ?–0.1)
CAT DANDER IGE QN: <0.1 KUA/L (ref ?–0.1)
CLAM IGE QN: <0.1 KUA/L (ref ?–0.1)
CMN PIGWEED IGE QN: <0.1 KUA/L (ref ?–0.1)
CODFISH IGE QN: <0.1 KUA/L (ref ?–0.1)
COMMON RAGWEED IGE QN: <0.1 KUA/L (ref ?–0.1)
CORN IGE QN: <0.1 KUA/L (ref ?–0.1)
COTTONWOOD IGE QN: <0.1 KUA/L (ref ?–0.1)
COW MILK IGE QN: <0.1 KUA/L (ref ?–0.1)
D FARINAE IGE QN: <0.1 KUA/L (ref ?–0.1)
D PTERONYSS IGE QN: <0.1 KUA/L (ref ?–0.1)
DOG DANDER IGE QN: <0.1 KUA/L (ref ?–0.1)
EGG WHITE IGE QN: <0.1 KUA/L (ref ?–0.1)
IGE SERPL-ACNC: 24.9 KU/L (ref 2–214)
IGE SERPL-ACNC: 25.7 KU/L (ref 2–214)
M RACEMOSUS IGE QN: <0.1 KUA/L (ref ?–0.1)
MARSH ELDER IGE QN: <0.1 KUA/L (ref ?–0.1)
MOUSE EPITH IGE QN: <0.1 KUA/L (ref ?–0.1)
MT JUNIPER IGE QN: <0.1 KUA/L (ref ?–0.1)
P NOTATUM IGE QN: <0.1 KUA/L (ref ?–0.1)
PEANUT IGE QN: <0.1 KUA/L (ref ?–0.1)
PECAN/HICK TREE IGE QN: <0.1 KUA/L (ref ?–0.1)
ROACH IGE QN: <0.1 KUA/L (ref ?–0.1)
SALTWORT IGE QN: <0.1 KUA/L (ref ?–0.1)
SCALLOP IGE QN: <0.1 KUA/L (ref ?–0.1)
SESAME SEED IGE QN: <0.1 KUA/L (ref ?–0.1)
SHRIMP IGE QN: <0.1 KUA/L (ref ?–0.1)
SOYBEAN IGE QN: <0.1 KUA/L (ref ?–0.1)
TIMOTHY IGE QN: <0.1 KUA/L (ref ?–0.1)
WALNUT IGE QN: <0.1 KUA/L (ref ?–0.1)
WHEAT IGE QN: <0.1 KUA/L (ref ?–0.1)
WHITE ASH IGE QN: <0.1 KUA/L (ref ?–0.1)
WHITE ELM IGE QN: <0.1 KUA/L (ref ?–0.1)
WHITE MULBERRY IGE QN: <0.1 KUA/L (ref ?–0.1)
WHITE OAK IGE QN: <0.1 KUA/L (ref ?–0.1)

## 2022-10-17 ENCOUNTER — HOSPITAL ENCOUNTER (OUTPATIENT)
Dept: MAMMOGRAPHY | Facility: HOSPITAL | Age: 37
Discharge: HOME | End: 2022-10-17
Attending: FAMILY MEDICINE
Payer: COMMERCIAL

## 2022-10-17 ENCOUNTER — HOSPITAL ENCOUNTER (OUTPATIENT)
Dept: ULTRASOUND IMAGING | Facility: HOSPITAL | Age: 37
Discharge: HOME | End: 2022-10-17
Attending: FAMILY MEDICINE
Payer: COMMERCIAL

## 2022-10-17 DIAGNOSIS — N94.10 DYSPAREUNIA, FEMALE: ICD-10-CM

## 2022-10-17 DIAGNOSIS — Z12.31 ENCOUNTER FOR SCREENING MAMMOGRAM FOR MALIGNANT NEOPLASM OF BREAST: ICD-10-CM

## 2022-10-17 PROCEDURE — 77063 BREAST TOMOSYNTHESIS BI: CPT | Performed by: FAMILY MEDICINE

## 2022-10-17 PROCEDURE — 77067 SCR MAMMO BI INCL CAD: CPT | Performed by: FAMILY MEDICINE

## 2022-10-17 PROCEDURE — 76830 TRANSVAGINAL US NON-OB: CPT | Performed by: FAMILY MEDICINE

## 2022-10-17 PROCEDURE — 76856 US EXAM PELVIC COMPLETE: CPT | Performed by: FAMILY MEDICINE

## 2022-11-06 ENCOUNTER — PATIENT MESSAGE (OUTPATIENT)
Dept: INTERNAL MEDICINE CLINIC | Facility: CLINIC | Age: 37
End: 2022-11-06

## 2022-11-10 NOTE — TELEPHONE ENCOUNTER
Please let her know that during her upcoming appt in 2 wks, we will re-discuss and order what is appropriate and needed at that time.

## 2022-11-18 ENCOUNTER — TELEPHONE (OUTPATIENT)
Dept: INTERNAL MEDICINE CLINIC | Facility: CLINIC | Age: 37
End: 2022-11-18

## 2022-11-18 NOTE — TELEPHONE ENCOUNTER
Called patient to get more info for referral. Patient will provide names on both Psychiatrist and Psychologist from [facility]. Patient will send another [employer] message when she gets off work.

## 2022-11-23 ENCOUNTER — OFFICE VISIT (OUTPATIENT)
Dept: INTERNAL MEDICINE CLINIC | Facility: CLINIC | Age: 37
End: 2022-11-23
Payer: COMMERCIAL

## 2022-11-23 ENCOUNTER — TELEPHONE (OUTPATIENT)
Dept: INTERNAL MEDICINE CLINIC | Facility: CLINIC | Age: 37
End: 2022-11-23

## 2022-11-23 VITALS
SYSTOLIC BLOOD PRESSURE: 102 MMHG | WEIGHT: 126 LBS | OXYGEN SATURATION: 98 % | TEMPERATURE: 98 F | HEART RATE: 90 BPM | DIASTOLIC BLOOD PRESSURE: 60 MMHG | BODY MASS INDEX: 21 KG/M2

## 2022-11-23 DIAGNOSIS — F41.1 GAD (GENERALIZED ANXIETY DISORDER): ICD-10-CM

## 2022-11-23 DIAGNOSIS — F42.9 OBSESSIVE-COMPULSIVE DISORDER, UNSPECIFIED TYPE: ICD-10-CM

## 2022-11-23 DIAGNOSIS — L81.1 MELASMA: Primary | ICD-10-CM

## 2022-11-23 PROCEDURE — 99214 OFFICE O/P EST MOD 30 MIN: CPT | Performed by: FAMILY MEDICINE

## 2022-11-23 PROCEDURE — 3074F SYST BP LT 130 MM HG: CPT | Performed by: FAMILY MEDICINE

## 2022-11-23 PROCEDURE — 3078F DIAST BP <80 MM HG: CPT | Performed by: FAMILY MEDICINE

## 2022-11-23 RX ORDER — FLUOXETINE HYDROCHLORIDE 40 MG/1
40 CAPSULE ORAL DAILY
Qty: 90 CAPSULE | Refills: 3 | Status: SHIPPED | OUTPATIENT
Start: 2022-11-23

## 2022-11-23 NOTE — TELEPHONE ENCOUNTER
Pt called stating that the referral provided for [facility] in [place], she wont be able use it, since they require for her to be a pt of their own clinics    Pt is requesting to be referred to another psychiatrist     Please advise

## (undated) NOTE — LETTER
VACCINE ADMINISTRATION RECORD  PARENT / GUARDIAN APPROVAL  Date: 2019  Vaccine administered to: Justin Agarwal     : 1985    MRN: [id number]    A copy of the appropriate Centers for Disease Control and Prevention Vaccine Information stateme

## (undated) NOTE — MR AVS SNAPSHOT
After Visit Summary   8/14/2020    Laila Barry    MRN: [id number]           Visit Information     Date & Time  8/14/2020 10:20 AM Provider  Kendell Pandya MD [address] [place] [address], [address] [place] [address],[address], [facility].  Phone  3 [facility] now offers Video Visits through [address] for adult and pediatric patients. Video Visits are available Monday - Friday for many common conditions such as allergies, colds, cough, fever, rash, sore throat, headache and pink eye.   The cost for a Video [employer]   Monday – Friday  4:00 pm – 10:00 pm   Saturday – Sunday  10:00 am – 4:00 pm  WALK-IN CARE  Emergency Medicine Providers  Conditions needing urgent attention, but are   non-life-threatening.     Also available by appointment Average cost  $120*

## (undated) NOTE — LETTER
May 8, 2019         Kev Bowers, [address]  [address]      Patient: Marilee Gottron   YOB: 1985   Date of Visit: 5/8/2019       Dear Dr. Nedra Frye MD,    I saw your patient, Oskar Hardin Re

## (undated) NOTE — LETTER
2020    Dary LEON 1985          To Whom It May Concern:     The above named patient is under my professional care since 2019. Her most recent visit was today.   I am familiar with her medical history and with the

## (undated) NOTE — LETTER
AUTHORIZATION FOR SURGICAL OPERATION OR OTHER PROCEDURE    1.  I hereby authorize Dr. Isael Alvarenga, and [facility] staff assigned to my case to perform the following operation and/or procedure at the [facility]:    COLPOSCOPY    2.  My physici Responsible person                          []  Spouse  In case of minor or                    [] Other  _____________   Incompetent name:  __________________________________________________                               (please print)      _____________